# Patient Record
Sex: MALE | Race: WHITE | Employment: FULL TIME | URBAN - METROPOLITAN AREA
[De-identification: names, ages, dates, MRNs, and addresses within clinical notes are randomized per-mention and may not be internally consistent; named-entity substitution may affect disease eponyms.]

---

## 2017-01-17 ENCOUNTER — TELEPHONE (OUTPATIENT)
Dept: NEUROLOGY | Age: 57
End: 2017-01-17

## 2017-01-17 NOTE — TELEPHONE ENCOUNTER
PT's wife would like a nurse to call her.  The pt had an episode that she describes as \"Strange and different\"

## 2017-01-17 NOTE — TELEPHONE ENCOUNTER
R/t call to patient's wife Guillaume Alvarez. She states her  is doing too good. He's having another one of his forgetful episodes this morning he started to shake and jerk really bad it lasted for about 45 minutes. He had his chin on his chest and appeared to be sleep when she called his name he jerked and woke up but didn't recognize her, had no memory of what had just happened or where he was. Had previously went to the store like he always does but came back with a lot of items that he doesn't normally buy and didn't know why he had brought them but remembered going to the store. He normally doesn't remember anything after having a grand mal but she doesn't think he had one of those. Would like to bring him in for an appointment. I acknowledged understanding. Appointment scheduled for 1/18/17 at 1:30 pm with an 1:15 pm arrival time at the [facility] office with the NP. She verbalized understanding.

## 2017-01-18 ENCOUNTER — OFFICE VISIT (OUTPATIENT)
Dept: NEUROLOGY | Age: 57
End: 2017-01-18

## 2017-01-18 VITALS
DIASTOLIC BLOOD PRESSURE: 76 MMHG | SYSTOLIC BLOOD PRESSURE: 118 MMHG | OXYGEN SATURATION: 96 % | RESPIRATION RATE: 16 BRPM | WEIGHT: 254 LBS | BODY MASS INDEX: 32.6 KG/M2 | HEIGHT: 74 IN | HEART RATE: 66 BPM

## 2017-01-18 DIAGNOSIS — G40.209 PARTIAL EPILEPSY WITH IMPAIRMENT OF CONSCIOUSNESS (HCC): Primary | ICD-10-CM

## 2017-01-18 DIAGNOSIS — R40.4 ALTERED LEVEL OF CONSCIOUSNESS: ICD-10-CM

## 2017-01-18 NOTE — PROGRESS NOTES
Patient present for a follow up. Accompanied with wife. Reports experienced a lapse in time yesterday and hasn't experienced that in almost 11 years when he had his first grand mal.   Doesn't remember anything, feels a little off balance.

## 2017-01-18 NOTE — PATIENT INSTRUCTIONS
[facility]   Statement to Patients  April 1, 2014      In an effort to ensure the large volume of patient prescription refills is processed in the most efficient and expeditious manner, we are asking our patients to assist us by calling your Pharmacy for all prescription refills, this will include also your  Mail Order Pharmacy. The pharmacy will contact our office electronically to continue the refill process. Please do not wait until the last minute to call your pharmacy. We need at least 48 hours (2days) to fill prescriptions. We also encourage you to call your pharmacy before going to  your prescription to make sure it is ready. With regard to controlled substance prescription refill requests (narcotic refills) that need to be picked up at our office, we ask your cooperation by providing us with at least 72 hours (3days) notice that you will need a refill. We will not refill narcotic prescription refill requests after 4:00pm on any weekday, Monday through Thursday, or after 2:00pm on Fridays, or on the weekends. We encourage everyone to explore another way of getting your prescription refill request processed using wali, our patient web portal through our electronic medical record system. wali is an efficient and effective way to communicate your medication request directly to the office and  downloadable as an jonathan on your smart phone . wali also features a review functionality that allows you to view your medication list as well as leave messages for your physician. Are you ready to get connected? If so please review the attatched instructions or speak to any of our staff to get you set up right away! Thank you so much for your cooperation. Should you have any questions please contact our Practice Administrator.     The Physicians and Staff,  [facility]         Thank you for choosing [facility] and [facility] for your     care. You may receive a survey about your visit. We appreciate you taking time     to complete this survey as we use your feedback to improve our services. We     realize we are not perfect, but we strive to provide excellent care. Learning About Bereket Aldana  What is a living will? A living will is a legal form you use to write down the kind of care you want at the end of your life. It is used by the health professionals who will treat you if you aren't able to decide for yourself. If you put your wishes in writing, your loved ones and others will know what kind of care you want. They won't need to guess. This can ease your mind and be helpful to others. A living will is not the same as an estate or property will. An estate will explains what you want to happen with your money and property after you die. Is a living will a legal document? A living will is a legal document. Each state has its own laws about living blackman. If you move to another state, make sure that your living will is legal in the state where you now live. Or you might use a universal form that has been approved by many states. This kind of form can sometimes be completed and stored online. Your electronic copy will then be available wherever you have a connection to the Internet. In most cases, doctors will respect your wishes even if you have a form from a different state. · You don't need an  to complete a living will. But legal advice can be helpful if your state's laws are unclear, your health history is complicated, or your family can't agree on what should be in your living will. · You can change your living will at any time. Some people find that their wishes about end-of-life care change as their health changes. · In addition to making a living will, think about completing a medical power of  form.  This form lets you name the person you want to make end-of-life treatment decisions for you (your \"health care agent\") if you're not able to. Many hospitals and nursing homes will give you the forms you need to complete a living will and a medical power of . · Your living will is used only if you can't make or communicate decisions for yourself anymore. If you become able to make decisions again, you can accept or refuse any treatment, no matter what you wrote in your living will. · Your state may offer an online registry. This is a place where you can store your living will online so the doctors and nurses who need to treat you can find it right away. What should you think about when creating a living will? Talk about your end-of-life wishes with your family members and your doctor. Let them know what you want. That way the people making decisions for you won't be surprised by your choices. Think about these questions as you make your living will:  · Do you know enough about life support methods that might be used? If not, talk to your doctor so you know what might be done if you can't breathe on your own, your heart stops, or you're unable to swallow. · What things would you still want to be able to do after you receive life-support methods? Would you want to be able to walk? To speak? To eat on your own? To live without the help of machines? · If you have a choice, where do you want to be cared for? In your home? At a hospital or nursing home? · Do you want certain Pentecostalism practices performed if you become very ill? · If you have a choice at the end of your life, where would you prefer to die? At home? In a hospital or nursing home? Somewhere else? · Would you prefer to be buried or cremated? · Do you want your organs to be donated after you die? What should you do with your living will? · Make sure that your family members and your health care agent have copies of your living will. · Give your doctor a copy of your living will to keep in your medical record.  If you have more than one doctor, make sure that each one has a copy. · You may want to put a copy of your living will where it can be easily found. Where can you learn more? Go to http://trent-karen.info/. Enter W811 in the search box to learn more about \"Learning About Living Perroy. \"  Current as of: February 24, 2016  Content Version: 11.1  © 2730-[address]. Care instructions adapted under license by [employer] (which disclaims liability or warranty for this information). If you have questions about a medical condition or this instruction, always ask your healthcare professional. [address] any warranty or liability for your use of this information.

## 2017-01-18 NOTE — MR AVS SNAPSHOT
Visit Information Date & Time Provider Department Dept. Phone Encounter #  
 1/18/2017  1:30 PM Gagandeep Vasques NP [facility] [phone] [id number] Follow-up Instructions Return after testing. Upcoming Health Maintenance Date Due Hepatitis C Screening 1960 DTaP/Tdap/Td series (1 - Tdap) 12/12/1981 FOBT Q 1 YEAR AGE 50-75 12/12/2010 INFLUENZA AGE 9 TO ADULT 8/1/2016 Allergies as of 1/18/2017  Review Complete On: 1/18/2017 By: Alexey Chacon LPN Severity Noted Reaction Type Reactions Levetiracetam  08/16/2012    Other (comments) Generic form causes severe Headache Current Immunizations  Never Reviewed No immunizations on file. Not reviewed this visit You Were Diagnosed With   
  
 Codes Comments [facility] epilepsy [facility] of [facility]    -  Primary ICD-10-CM: U07.054 ICD-9-CM: 345.40 Vitals BP Pulse Resp Height(growth percentile) Weight(growth percentile) SpO2  
 118/76 66 16 6' 2.21\" (1.885 m) 254 lb (115.2 kg) 96% BMI Smoking Status 32.43 kg/m2 Former Smoker Vitals History BMI and BSA Data Body Mass Index Body Surface Area  
 32.43 kg/m 2 2.46 m 2 Preferred Pharmacy Pharmacy Name Phone [id number], [address]) [phone] Your Updated Medication List  
  
   
This list is accurate as of: 1/18/17  2:17 PM.  Always use your most recent med list.  
  
  
  
  
 BENICAR 20 mg tablet Generic drug:  olmesartan Take 20 mg by mouth daily. levETIRAcetam 500 mg tablet Commonly known as:  KEPPRA Take 3 and 1/2 tablets in the AM and 3 and 1/2 tablets in the PM Brand name Ileana Only * ZOLMitriptan 5 mg nasal solution Commonly known as:  ZOMIG  
1 spray in nostril at HA onset. May repeat again in 2 hours X 1 dose.   No mor then 2 doses in 24 hours * ZOLMitriptan 5 mg nasal solution Commonly known as:  ZOMIG  
1 Spray by Nasal route once as needed for Migraine for up to 1 dose. * Notice: This list has 2 medication(s) that are the same as other medications prescribed for you. Read the directions carefully, and ask your doctor or other care provider to review them with you. We Performed the Following CBC WITH AUTOMATED DIFF [30104 CPT(R)] LEVETIRACETAM (KEPPRA) B606194 CPT(R)] METABOLIC PANEL, COMPREHENSIVE [33883 CPT(R)] VITAMIN B12 [id number] CPT(R)] Follow-up Instructions Return after testing. To-Do List   
 01/18/2017 Imaging:  DUPLEX CAROTID BILATERAL AMB NEURO   
  
 01/18/2017 Neurology:  EEG AMB NEURO   
  
 01/18/2017 Imaging:  MRI BRAIN W WO CONT   
  
 01/18/2017 Neurology:  NEURO EEG 24 HR Referral Information Referral ID Referred By Referred To  
  
 [id number] Catrachita Arango Not Available Visits Status Start Date End Date 1 New Request 1/18/17 1/18/18 If your referral has a status of pending review or denied, additional information will be sent to support the outcome of this decision. Referral ID Referred By Referred To  
 [id number] Catrachita Pnea Mizaira Not Available Visits Status Start Date End Date 1 New Request 1/18/17 1/18/18 If your referral has a status of pending review or denied, additional information will be sent to support the outcome of this decision. Patient Instructions PRESCRIPTION REFILL POLICY [facility] Statement to Patients April 1, 2014 In an effort to ensure the large volume of patient prescription refills is processed in the most efficient and expeditious manner, we are asking our patients to assist us by calling your Pharmacy for all prescription refills, this will include also your  Mail Order Pharmacy.  The pharmacy will contact our office electronically to continue the refill process. Please do not wait until the last minute to call your pharmacy. We need at least 48 hours (2days) to fill prescriptions. We also encourage you to call your pharmacy before going to  your prescription to make sure it is ready. With regard to controlled substance prescription refill requests (narcotic refills) that need to be picked up at our office, we ask your cooperation by providing us with at least 72 hours (3days) notice that you will need a refill. We will not refill narcotic prescription refill requests after 4:00pm on any weekday, Monday through Thursday, or after 2:00pm on Fridays, or on the weekends. We encourage everyone to explore another way of getting your prescription refill request processed using Rypple, our patient web portal through our electronic medical record system. Rypple is an efficient and effective way to communicate your medication request directly to the office and  downloadable as an jonathan on your smart phone . Rypple also features a review functionality that allows you to view your medication list as well as leave messages for your physician. Are you ready to get connected? If so please review the attatched instructions or speak to any of our staff to get you set up right away! Thank you so much for your cooperation. Should you have any questions please contact our Practice Administrator. The Physicians and Staff,  [facility] Thank you for choosing [facility] and [facility] for your  
 
care. You may receive a survey about your visit. We appreciate you taking time  
 
to complete this survey as we use your feedback to improve our services. We  
 
realize we are not perfect, but we strive to provide excellent care. Edvin Rasmussen 1727 What is a living will? A living will is a legal form you use to write down the kind of care you want at the end of your life. It is used by the health professionals who will treat you if you aren't able to decide for yourself. If you put your wishes in writing, your loved ones and others will know what kind of care you want. They won't need to guess. This can ease your mind and be helpful to others. A living will is not the same as an estate or property will. An estate will explains what you want to happen with your money and property after you die. Is a living will a legal document? A living will is a legal document. Each state has its own laws about living blackman. If you move to another state, make sure that your living will is legal in the state where you now live. Or you might use a universal form that has been approved by many states. This kind of form can sometimes be completed and stored online. Your electronic copy will then be available wherever you have a connection to the Internet. In most cases, doctors will respect your wishes even if you have a form from a different state. · You don't need an  to complete a living will. But legal advice can be helpful if your state's laws are unclear, your health history is complicated, or your family can't agree on what should be in your living will. · You can change your living will at any time. Some people find that their wishes about end-of-life care change as their health changes. · In addition to making a living will, think about completing a medical power of  form. This form lets you name the person you want to make end-of-life treatment decisions for you (your \"health care agent\") if you're not able to. Many hospitals and nursing homes will give you the forms you need to complete a living will and a medical power of .  
· Your living will is used only if you can't make or communicate decisions for yourself anymore. If you become able to make decisions again, you can accept or refuse any treatment, no matter what you wrote in your living will. · Your state may offer an online registry. This is a place where you can store your living will online so the doctors and nurses who need to treat you can find it right away. What should you think about when creating a living will? Talk about your end-of-life wishes with your family members and your doctor. Let them know what you want. That way the people making decisions for you won't be surprised by your choices. Think about these questions as you make your living will: · Do you know enough about life support methods that might be used? If not, talk to your doctor so you know what might be done if you can't breathe on your own, your heart stops, or you're unable to swallow. · What things would you still want to be able to do after you receive life-support methods? Would you want to be able to walk? To speak? To eat on your own? To live without the help of machines? · If you have a choice, where do you want to be cared for? In your home? At a hospital or nursing home? · Do you want certain Sabianism practices performed if you become very ill? · If you have a choice at the end of your life, where would you prefer to die? At home? In a hospital or nursing home? Somewhere else? · Would you prefer to be buried or cremated? · Do you want your organs to be donated after you die? What should you do with your living will? · Make sure that your family members and your health care agent have copies of your living will. · Give your doctor a copy of your living will to keep in your medical record. If you have more than one doctor, make sure that each one has a copy. · You may want to put a copy of your living will where it can be easily found. Where can you learn more? Go to http://trent-karen.info/. Enter Z504 in the search box to learn more about \"Learning About Bereket Ellington. \" Current as of: February 24, 2016 Content Version: 11.1 © 7773-1466 [employer]. Care instructions adapted under license by [employer] (which disclaims liability or warranty for this information). If you have questions about a medical condition or this instruction, always ask your healthcare professional. [address] any warranty or liability for your use of this information. Introducing [facility]! [facility] introduces [employer] patient portal. Now you can access parts of your medical record, email your doctor's office, and request medication refills online. 1. In your internet browser, go to [email or web address] 2. Click on the First Time User? Click Here link in the Sign In box. You will see the New Member Sign Up page. 3. Enter your [employer] Access Code exactly as it appears below. You will not need to use this code after youve completed the sign-up process. If you do not sign up before the expiration date, you must request a new code. · [employer] Access Code: [email or web address] Expires: 4/18/2017  1:35 PM 
 
4. Enter the last four digits of your Social Security Number (xxxx) and Date of Birth (mm/dd/yyyy) as indicated and click Submit. You will be taken to the next sign-up page. 5. Create a [employer] ID. This will be your [employer] login ID and cannot be changed, so think of one that is secure and easy to remember. 6. Create a [employer] password. You can change your password at any time. 7. Enter your Password Reset Question and Answer. This can be used at a later time if you forget your password. 8. Enter your e-mail address. You will receive e-mail notification when new information is available in [address] 9. Click Sign Up. You can now view and download portions of your medical record. 10. Click the Download Summary menu link to download a portable copy of your medical information. If you have questions, please visit the Frequently Asked Questions section of the [employer] website. Remember, [employer] is NOT to be used for urgent needs. For medical emergencies, dial 911. Now available from your iPhone and Android! Please provide this summary of care documentation to your next provider. Your primary care clinician is listed as Abilio Roberts. If you have any questions after today's visit, please call [phone].
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
## 2017-01-20 LAB
ALBUMIN SERPL-MCNC: 4.6 G/DL (ref 3.5–5.5)
ALBUMIN/GLOB SERPL: 2.1 {RATIO} (ref 1.1–2.5)
ALP SERPL-CCNC: 53 IU/L (ref 39–117)
ALT SERPL-CCNC: 30 IU/L (ref 0–44)
AST SERPL-CCNC: 23 IU/L (ref 0–40)
BASOPHILS # BLD AUTO: 0 X10E3/UL (ref 0–0.2)
BASOPHILS NFR BLD AUTO: 0 %
BILIRUB SERPL-MCNC: 0.4 MG/DL (ref 0–1.2)
BUN SERPL-MCNC: 13 MG/DL (ref 6–24)
BUN/CREAT SERPL: 22 (ref 9–20)
CALCIUM SERPL-MCNC: 9.1 MG/DL (ref 8.7–10.2)
CHLORIDE SERPL-SCNC: 100 MMOL/L (ref 96–106)
CO2 SERPL-SCNC: 25 MMOL/L (ref 18–29)
CREAT SERPL-MCNC: 0.6 MG/DL (ref 0.76–1.27)
EOSINOPHIL # BLD AUTO: 0.2 X10E3/UL (ref 0–0.4)
EOSINOPHIL NFR BLD AUTO: 2 %
ERYTHROCYTE [DISTWIDTH] IN BLOOD BY AUTOMATED COUNT: 13.7 % (ref 12.3–15.4)
GLOBULIN SER CALC-MCNC: 2.2 G/DL (ref 1.5–4.5)
GLUCOSE SERPL-MCNC: 70 MG/DL (ref 65–99)
HCT VFR BLD AUTO: 40.9 % (ref 37.5–51)
HGB BLD-MCNC: 13.6 G/DL (ref 12.6–17.7)
IMM GRANULOCYTES # BLD: 0 X10E3/UL (ref 0–0.1)
IMM GRANULOCYTES NFR BLD: 0 %
LEVETIRACETAM SERPL-MCNC: 17.4 UG/ML (ref 10–40)
LYMPHOCYTES # BLD AUTO: 2.7 X10E3/UL (ref 0.7–3.1)
LYMPHOCYTES NFR BLD AUTO: 31 %
MCH RBC QN AUTO: 29.6 PG (ref 26.6–33)
MCHC RBC AUTO-ENTMCNC: 33.3 G/DL (ref 31.5–35.7)
MCV RBC AUTO: 89 FL (ref 79–97)
MONOCYTES # BLD AUTO: 0.7 X10E3/UL (ref 0.1–0.9)
MONOCYTES NFR BLD AUTO: 8 %
NEUTROPHILS # BLD AUTO: 5 X10E3/UL (ref 1.4–7)
NEUTROPHILS NFR BLD AUTO: 59 %
PLATELET # BLD AUTO: 266 X10E3/UL (ref 150–379)
POTASSIUM SERPL-SCNC: 4.1 MMOL/L (ref 3.5–5.2)
PROT SERPL-MCNC: 6.8 G/DL (ref 6–8.5)
RBC # BLD AUTO: 4.6 X10E6/UL (ref 4.14–5.8)
SODIUM SERPL-SCNC: 139 MMOL/L (ref 134–144)
VIT B12 SERPL-MCNC: 391 PG/ML (ref 211–946)
WBC # BLD AUTO: 8.5 X10E3/UL (ref 3.4–10.8)

## 2017-01-24 ENCOUNTER — HOSPITAL ENCOUNTER (OUTPATIENT)
Dept: MRI IMAGING | Age: 57
Discharge: HOME | End: 2017-01-24
Attending: NURSE PRACTITIONER

## 2017-01-24 DIAGNOSIS — G40.209 PARTIAL EPILEPSY WITH IMPAIRMENT OF CONSCIOUSNESS (HCC): Primary | ICD-10-CM

## 2017-01-24 DIAGNOSIS — G40.209 PARTIAL EPILEPSY WITH IMPAIRMENT OF CONSCIOUSNESS (HCC): ICD-10-CM

## 2017-01-24 DIAGNOSIS — R41.3 MEMORY LOSS: ICD-10-CM

## 2017-01-27 ENCOUNTER — OFFICE VISIT (OUTPATIENT)
Dept: NEUROLOGY | Age: 57
End: 2017-01-27

## 2017-01-27 DIAGNOSIS — R42 DIZZINESS AND GIDDINESS: Primary | ICD-10-CM

## 2017-01-27 DIAGNOSIS — G40.209 PARTIAL EPILEPSY WITH IMPAIRMENT OF CONSCIOUSNESS (HCC): ICD-10-CM

## 2017-01-30 NOTE — PROCEDURES
EEG:      Date:  01/27/17    Requesting Physician:  Sara Styles NP     An EEG is requested in this 64 y.o. man with history of epilepsy to evaluate for epileptiform abnormalities. Medications are listed as Zomig, Keppra, Benicar. This tracing is obtained during the awake state. During wakefulness, there are intermittent runs of posteriorly dominant and symmetrical low to medium amplitude 10-11 cycle per second activities which attenuate with eye opening. Lower voltage faster frequency activities are seen symmetrically over the anterior head regions. Hyperventilation is performed for three minutes with good effort and little alters tracing. Intermittent photic stimulation induces symmetric posterior driving responses. Sleep is not obtained. Interpretation: This EEG recorded during the awake state is normal.  No epileptiform abnormalities are seen.

## 2017-01-31 NOTE — PROCEDURES
Carotid Doppler:     Date:  01/27/17    Requesting Physician:  Mercedez Padron NP     Indication:  Dizziness     B-mode imaging reveals no significant plaque throughout the carotid systems bilaterally. Doppler spectral analysis reveals no elevated velocities. Vertebral artery flow antegrade bilaterally. Interpretation:  Normal study.

## 2017-02-02 ENCOUNTER — HOSPITAL ENCOUNTER (OUTPATIENT)
Dept: NEUROLOGY | Age: 57
Discharge: HOME | End: 2017-02-02
Attending: NURSE PRACTITIONER
Payer: COMMERCIAL

## 2017-02-02 ENCOUNTER — HOSPITAL ENCOUNTER (OUTPATIENT)
Dept: CT IMAGING | Age: 57
Discharge: HOME | End: 2017-02-02
Attending: NURSE PRACTITIONER
Payer: COMMERCIAL

## 2017-02-02 DIAGNOSIS — R41.3 MEMORY LOSS: ICD-10-CM

## 2017-02-02 DIAGNOSIS — G40.209 PARTIAL EPILEPSY WITH IMPAIRMENT OF CONSCIOUSNESS (HCC): ICD-10-CM

## 2017-02-02 PROCEDURE — 95953 NEURO EEG 24 HR: CPT

## 2017-02-02 PROCEDURE — 74011636320 HC RX REV CODE- 636/320: Performed by: RADIOLOGY

## 2017-02-02 PROCEDURE — 70470 CT HEAD/BRAIN W/O & W/DYE: CPT

## 2017-02-02 RX ADMIN — IOPAMIDOL 100 ML: 612 INJECTION, SOLUTION INTRAVENOUS at 08:02

## 2017-02-12 NOTE — PROCEDURES
[facility]   [address] [facility], [address]   ROUTINE EEG REPORT       Name:  Christina Sinha   MR#:  [id number]   :  1960   Account #:  [de-identified]    Date of Procedure:  2017   Date of Adm:  2017       REQUESTING PHYSICIAN: Dajuan Mack NP.    REASON FOR STUDY: A 24-hour ambulatory EEG is requested in   this 59-year-old man with history of epilepsy and new recurrent events   to evaluate for epileptiform abnormalities. MEDICATIONS LISTED AS   1. Keppra. 2. Zomig. 3. Benicar. RECORDING START TIME: 0853 hours, 2017. RECORDING END TIME: 0750 hours 2017. TOTAL RECORDING TIME: 22 hours 57 minutes. A patient diary accompanies the tracing. Computerized spike and   seizure detection software is utilized. During wakefulness, the background consists of diffuse low-voltage   fast frequency beta wave activity. Normal sleep architecture is seen. Review of patient diary finds no abnormal events of interest.    Review of computerized spike and seizure detection software reveals   no spikes, no seizures.     INTERPRETATION: A 24-hour ambulatory electroencephalogram is   normal.        Jennifer Fry MD      SLE / RS   D:  2017   18:11   T:  2017   05:30   Job #:  [id number]

## 2017-02-17 ENCOUNTER — OFFICE VISIT (OUTPATIENT)
Dept: NEUROLOGY | Age: 57
End: 2017-02-17

## 2017-02-17 VITALS
BODY MASS INDEX: 32.73 KG/M2 | SYSTOLIC BLOOD PRESSURE: 118 MMHG | WEIGHT: 255 LBS | HEART RATE: 75 BPM | DIASTOLIC BLOOD PRESSURE: 72 MMHG | OXYGEN SATURATION: 97 % | RESPIRATION RATE: 18 BRPM | HEIGHT: 74 IN

## 2017-02-17 DIAGNOSIS — G40.209 PARTIAL EPILEPSY WITH IMPAIRMENT OF CONSCIOUSNESS (HCC): Primary | ICD-10-CM

## 2017-02-17 RX ORDER — ASPIRIN 81 MG/1
TABLET ORAL DAILY
COMMUNITY
End: 2017-10-30

## 2017-02-17 NOTE — MR AVS SNAPSHOT
Visit Information Date & Time Provider Department Dept. Phone Encounter #  
 2/17/2017 11:30 AM Rudi Dose, NP Neurology [facility] [phone] [id number] Follow-up Instructions Return in about 2 months (around 4/17/2017). Upcoming Health Maintenance Date Due Hepatitis C Screening 1960 DTaP/Tdap/Td series (1 - Tdap) 12/12/1981 FOBT Q 1 YEAR AGE 50-75 12/12/2010 INFLUENZA AGE 9 TO ADULT 8/1/2016 Allergies as of 2/17/2017  Review Complete On: 2/17/2017 By: Zenaida Cunningham LPN Severity Noted Reaction Type Reactions Levetiracetam  08/16/2012    Other (comments) Generic form causes severe Headache Current Immunizations  Never Reviewed No immunizations on file. Not reviewed this visit You Were Diagnosed With   
  
 Codes Comments Partial [facility] ([facility]    -  Primary ICD-10-CM: M05.889 ICD-9-CM: 345.40 Vitals BP Pulse Resp Height(growth percentile) Weight(growth percentile) SpO2  
 118/72 75 18 6' 2.21\" (1.885 m) 255 lb (115.7 kg) 97% BMI Smoking Status 32.56 kg/m2 Former Smoker Vitals History BMI and BSA Data Body Mass Index Body Surface Area 32.56 kg/m 2 2.46 m 2 Preferred Pharmacy Pharmacy Name Phone [facility]/PHARMACY #[id number]- [place], [address] [facility] [address] [place] [address]) [phone] Your Updated Medication List  
  
   
This list is accurate as of: 2/17/17 12:18 PM.  Always use your most recent med list.  
  
  
  
  
 aspirin delayed-release 81 mg tablet Take  by mouth daily. BENICAR 20 mg tablet Generic drug:  olmesartan Take 20 mg by mouth daily. eslicarbazepine 510 mg Tab Commonly known as:  APTIOM Take 1 Tab by mouth daily. levETIRAcetam 500 mg tablet Commonly known as:  KEPPRA Take 3 and 1/2 tablets in the AM and 3 and 1/2 tablets in the PM Brand name Torrent Only * ZOLMitriptan 5 mg nasal solution Commonly known as:  ZOMIG  
1 spray in nostril at HA onset. May repeat again in 2 hours X 1 dose. No mor then 2 doses in 24 hours * ZOLMitriptan 5 mg nasal solution Commonly known as:  ZOMIG  
1 Spray by Nasal route once as needed for Migraine for up to 1 dose. * Notice: This list has 2 medication(s) that are the same as other medications prescribed for you. Read the directions carefully, and ask your doctor or other care provider to review them with you. Prescriptions Sent to Pharmacy Refills  
 eslicarbazepine (APTIOM) 800 mg tab 5 Sig: Take 1 Tab by mouth daily. Class: Normal  
 Pharmacy: [facility]/pharmacy #[id number]- [address], [address] ([place] [address]) Ph #: [phone] Route: Oral  
  
Follow-up Instructions Return in about 2 months (around 4/17/2017). Patient Instructions PRESCRIPTION REFILL POLICY [facility] Statement to Patients April 1, 2014 In an effort to ensure the large volume of patient prescription refills is processed in the most efficient and expeditious manner, we are asking our patients to assist us by calling your Pharmacy for all prescription refills, this will include also your  Mail Order Pharmacy. The pharmacy will contact our office electronically to continue the refill process. Please do not wait until the last minute to call your pharmacy. We need at least 48 hours (2days) to fill prescriptions. We also encourage you to call your pharmacy before going to  your prescription to make sure it is ready. With regard to controlled substance prescription refill requests (narcotic refills) that need to be picked up at our office, we ask your cooperation by providing us with at least 72 hours (3days) notice that you will need a refill. We will not refill narcotic prescription refill requests after 4:00pm on any weekday, Monday through Thursday, or after 2:00pm on Fridays, or on the weekends. We encourage everyone to explore another way of getting your prescription refill request processed using [employer], our patient web portal through our electronic medical record system. [employer] is an efficient and effective way to communicate your medication request directly to the office and  downloadable as an jonathan on your smart phone . [employer] also features a review functionality that allows you to view your medication list as well as leave messages for your physician. Are you ready to get connected? If so please review the attatched instructions or speak to any of our staff to get you set up right away! Thank you so much for your cooperation. Should you have any questions please contact our Practice Administrator. The Physicians and Staff,  [facility] [phone] What is a living will? A living will is a legal form you use to write down the kind of care you want at the end of your life. It is used by the health professionals who will treat you if you aren't able to decide for yourself. If you put your wishes in writing, your loved ones and others will know what kind of care you want. They won't need to guess. This can ease your mind and be helpful to others. A living will is not the same as an estate or property will. An estate will explains what you want to happen with your money and property after you die. Is a living will a legal document? A living will is a legal document. Each state has its own laws about living blackman. If you move to another state, make sure that your living will is legal in the state where you now live. Or you might use a universal form that has been approved by many states. This kind of form can sometimes be completed and stored online.  Your electronic copy will then be available wherever you have a connection to the Internet. In most cases, doctors will respect your wishes even if you have a form from a different state. · You don't need an  to complete a living will. But legal advice can be helpful if your state's laws are unclear, your health history is complicated, or your family can't agree on what should be in your living will. · You can change your living will at any time. Some people find that their wishes about end-of-life care change as their health changes. · In addition to making a living will, think about completing a medical power of  form. This form lets you name the person you want to make end-of-life treatment decisions for you (your \"health care agent\") if you're not able to. Many hospitals and nursing homes will give you the forms you need to complete a living will and a medical power of . · Your living will is used only if you can't make or communicate decisions for yourself anymore. If you become able to make decisions again, you can accept or refuse any treatment, no matter what you wrote in your living will. · Your state may offer an online registry. This is a place where you can store your living will online so the doctors and nurses who need to treat you can find it right away. What should you think about when creating a living will? Talk about your end-of-life wishes with your family members and your doctor. Let them know what you want. That way the people making decisions for you won't be surprised by your choices. Think about these questions as you make your living will: · Do you know enough about life support methods that might be used? If not, talk to your doctor so you know what might be done if you can't breathe on your own, your heart stops, or you're unable to swallow.  
· What things would you still want to be able to do after you receive life-support methods? Would you want to be able to walk? To speak? To eat on your own? To live without the help of machines? · If you have a choice, where do you want to be cared for? In your home? At a hospital or nursing home? · Do you want certain Mandaeism practices performed if you become very ill? · If you have a choice at the end of your life, where would you prefer to die? At home? In a hospital or nursing home? Somewhere else? · Would you prefer to be buried or cremated? · Do you want your organs to be donated after you die? What should you do with your living will? · Make sure that your family members and your health care agent have copies of your living will. · Give your doctor a copy of your living will to keep in your medical record. If you have more than one doctor, make sure that each one has a copy. · You may want to put a copy of your living will where it can be easily found. Where can you learn more? Go to http://trent-karen.info/. Enter S474 in the search box to learn more about \"Learning About Living Perroy. \" Current as of: February 24, 2016 Content Version: 11.1 © 4183-5380 [employer], [employer]. Care instructions adapted under license by [employer] (which disclaims liability or warranty for this information). If you have questions about a medical condition or this instruction, always ask your healthcare professional. [address] any warranty or liability for your use of this information. Aptiom 400 mg daily X 2 weeks Then increase to Aptiom 600 mg daily X 2 weeks Then increase to Aptiom 800 mg daily and stick here. Continue Keppra at 1750 twice daily X 2 weeks Then decrease to 1500 mg twice daily X 1 week Then decrease to 1250 mg twice daily X 1 week Then decrease to 1000 mg daily X 1 week Then decrease to 500 mg daily X 1 week Then stop. Continue Aptiom at 800 mg daily. Introducing [facility]! [facility] introduces [employer] patient portal. Now you can access parts of your medical record, email your doctor's office, and request medication refills online. 1. In your internet browser, go to [email or web address] 2. Click on the First Time User? Click Here link in the Sign In box. You will see the New Member Sign Up page. 3. Enter your [employer] Access Code exactly as it appears below. You will not need to use this code after youve completed the sign-up process. If you do not sign up before the expiration date, you must request a new code. · [employer] Access Code: [email or web address] Expires: 4/18/2017  1:35 PM 
 
4. Enter the last four digits of your Social Security Number (xxxx) and Date of Birth (mm/dd/yyyy) as indicated and click Submit. You will be taken to the next sign-up page. 5. Create a [employer] ID. This will be your [employer] login ID and cannot be changed, so think of one that is secure and easy to remember. 6. Create a [employer] password. You can change your password at any time. 7. Enter your Password Reset Question and Answer. This can be used at a later time if you forget your password. 8. Enter your e-mail address. You will receive e-mail notification when new information is available in [address] 9. Click Sign Up. You can now view and download portions of your medical record. 10. Click the Download Summary menu link to download a portable copy of your medical information. If you have questions, please visit the Frequently Asked Questions section of the [employer] website. Remember, [employer] is NOT to be used for urgent needs. For medical emergencies, dial 911. Now available from your iPhone and Android! Please provide this summary of care documentation to your next provider. Your primary care clinician is listed as [facility]. If you have any questions after today's visit, please call [phone].

## 2017-02-17 NOTE — PROGRESS NOTES
Patient present for a follow up. Accompanied with wife. Test results: CT, EEG, 24 hour EEG, Doppler. Reports has been experiencing diarrhea 2 to 3 times a week due to taking the generic Keppra. Insurance will not pay for the Brand name.

## 2017-02-17 NOTE — PROGRESS NOTES
Mary Austin is a 64 y.o. male who presents with the following  Chief Complaint   Patient presents with    Results     CT, EEG, 24 hour EEG, Doppler       HPI Patient comes in for a follow up for CT, EEG, 24 hour EEG, doppler. Accompanied with wife. He has not  Had any seizure activity since his last episode. Has been having diarrhea and sleepiness since ever starting the keppra and when we increased a few months ago he states it has been worse since. Was on Depakote before this and had some side effects to this he could not tolerate. The Keppra side effects are too much now as he has diarrhea multiple times a day and week. Allergies   Allergen Reactions    Levetiracetam Other (comments)     Generic form causes severe Headache       Current Outpatient Prescriptions   Medication Sig    aspirin delayed-release 81 mg tablet Take  by mouth daily.  eslicarbazepine (APTIOM) 800 mg tab Take 1 Tab by mouth daily.  levETIRAcetam (KEPPRA) 500 mg tablet Take 3 and 1/2 tablets in the AM and 3 and 1/2 tablets in the PM Brand name Torrent Only    ZOLMitriptan (ZOMIG) 5 mg nasal solution 1 spray in nostril at HA onset. May repeat again in 2 hours X 1 dose. No mor then 2 doses in 24 hours    olmesartan (BENICAR) 20 mg tablet Take 20 mg by mouth daily.  ZOLMitriptan (ZOMIG) 5 mg nasal solution 1 Spray by Nasal route once as needed for Migraine for up to 1 dose. No current facility-administered medications for this visit.         History   Smoking Status    Former Smoker   Smokeless Tobacco    Not on file       Past Medical History   Diagnosis Date    Hypertension     Migraines     Seizures ([address]     Stroke [facility])        Past Surgical History   Procedure Laterality Date    Hx acl reconstruction       right knee 6 times       Family History   Problem Relation Age of Onset    Cancer Father      prostate    Stroke Maternal Aunt     Diabetes Sister        Social History     Social History    Marital status:      Spouse name: N/A    Number of children: N/A    Years of education: N/A     Social History Main Topics    Smoking status: Former Smoker    Smokeless tobacco: None    Alcohol use Yes      Comment: rarely    Drug use: No    Sexual activity: Not Asked     Other Topics Concern    None     Social History Narrative       Review of Systems   HENT: Negative for hearing loss and tinnitus. Eyes: Negative for blurred vision, double vision and photophobia. Cardiovascular: Negative for chest pain and palpitations. Gastrointestinal: Negative for nausea and vomiting. Neurological: Negative for dizziness, tingling, tremors, seizures, loss of consciousness, weakness and headaches. Remainder of comprehensive review is negative. Physical Exam :    Visit Vitals    /72    Pulse 75    Resp 18    Ht 6' 2.21\" (1.885 m)    Wt 115.7 kg (255 lb)    SpO2 97%    BMI 32.56 kg/m2             Results for orders placed or performed in visit on 01/18/17   CBC WITH AUTOMATED DIFF   Result Value Ref Range    WBC 8.5 3.4 - 10.8 x10E3/uL    RBC 4.60 4.14 - 5.80 x10E6/uL    HGB 13.6 12.6 - 17.7 g/dL    HCT 40.9 37.5 - 51.0 %    MCV 89 79 - 97 fL    MCH 29.6 26.6 - 33.0 pg    MCHC 33.3 31.5 - 35.7 g/dL    RDW 13.7 12.3 - 15.4 %    PLATELET 617 975 - 514 x10E3/uL    NEUTROPHILS 59 %    Lymphocytes 31 %    MONOCYTES 8 %    EOSINOPHILS 2 %    BASOPHILS 0 %    ABS. NEUTROPHILS 5.0 1.4 - 7.0 x10E3/uL    Abs Lymphocytes 2.7 0.7 - 3.1 x10E3/uL    ABS. MONOCYTES 0.7 0.1 - 0.9 x10E3/uL    ABS. EOSINOPHILS 0.2 0.0 - 0.4 x10E3/uL    ABS. BASOPHILS 0.0 0.0 - 0.2 x10E3/uL    IMMATURE GRANULOCYTES 0 %    ABS. IMM.  GRANS. 0.0 0.0 - 0.1 D86H0/ZW   METABOLIC PANEL, COMPREHENSIVE   Result Value Ref Range    Glucose 70 65 - 99 mg/dL    BUN 13 6 - 24 mg/dL    Creatinine 0.60 (L) 0.76 - 1.27 mg/dL    GFR est non- >59 mL/min/1.73    GFR est  >59 mL/min/1.73    BUN/Creatinine ratio 22 (H) 9 - 20    Sodium 139 134 - 144 mmol/L    Potassium 4.1 3.5 - 5.2 mmol/L    Chloride 100 96 - 106 mmol/L    CO2 25 18 - 29 mmol/L    Calcium 9.1 8.7 - 10.2 mg/dL    Protein, total 6.8 6.0 - 8.5 g/dL    Albumin 4.6 3.5 - 5.5 g/dL    GLOBULIN, TOTAL 2.2 1.5 - 4.5 g/dL    A-G Ratio 2.1 1.1 - 2.5    Bilirubin, total 0.4 0.0 - 1.2 mg/dL    Alk. phosphatase 53 39 - 117 IU/L    AST (SGOT) 23 0 - 40 IU/L    ALT (SGPT) 30 0 - 44 IU/L   LEVETIRACETAM (KEPPRA)   Result Value Ref Range    LEVETIRACETAM, S 17.4 10.0 - 40.0 ug/mL   VITAMIN B12   Result Value Ref Range    Vitamin B12 391 211 - 946 pg/mL       Orders Placed This Encounter    aspirin delayed-release 81 mg tablet     Sig: Take  by mouth daily.  eslicarbazepine (APTIOM) 800 mg tab     Sig: Take 1 Tab by mouth daily. Dispense:  30 Tab     Refill:  5       1. Partial epilepsy with [facility]        Follow-up Disposition:  Return in about 2 months (around 4/17/2017). CT, EEG, 24 hour EEG, Doppler all normal. No seizure activity. We discussed medications and such treatments and we discussed switching and he understands he is at more risk for seizures. We will switch to Aptiom and increase to 800 mg daily while decreasing down on the Keppra. Discussed with Dr. Rand Casillas titration and attached to instructions on print out. They have no questions at this time.      Maggie Wellington NP        This note will not be viewable in [facility]      Total time: 25 min   Counseling / coordination time: 25min   > 50% counseling / coordination?: Yes re: medications, seizures, treatment, titration

## 2017-02-17 NOTE — TELEPHONE ENCOUNTER
Order placed for Aptiom 400 mg samples #14 per verbal order Merrill Fulton, NP. Order placed for Aptiom 600 mg samples #14 per verbal order Merrill Fulton, NP.      Order placed for Aptiom 800 mg samples #14 per verbal order Merrill Fulton, NP.

## 2017-02-17 NOTE — PATIENT INSTRUCTIONS
[facility]   Statement to Patients  April 1, 2014      In an effort to ensure the large volume of patient prescription refills is processed in the most efficient and expeditious manner, we are asking our patients to assist us by calling your Pharmacy for all prescription refills, this will include also your  Mail Order Pharmacy. The pharmacy will contact our office electronically to continue the refill process. Please do not wait until the last minute to call your pharmacy. We need at least 48 hours (2days) to fill prescriptions. We also encourage you to call your pharmacy before going to  your prescription to make sure it is ready. With regard to controlled substance prescription refill requests (narcotic refills) that need to be picked up at our office, we ask your cooperation by providing us with at least 72 hours (3days) notice that you will need a refill. We will not refill narcotic prescription refill requests after 4:00pm on any weekday, Monday through Thursday, or after 2:00pm on Fridays, or on the weekends. We encourage everyone to explore another way of getting your prescription refill request processed using [employer], our patient web portal through our electronic medical record system. [employer] is an efficient and effective way to communicate your medication request directly to the office and  downloadable as an jonathan on your smart phone . [employer] also features a review functionality that allows you to view your medication list as well as leave messages for your physician. Are you ready to get connected? If so please review the attatched instructions or speak to any of our staff to get you set up right away! Thank you so much for your cooperation. Should you have any questions please contact our Practice Administrator.     The Physicians and Staff,  [facility]  What is a living will?  A living will is a legal form you use to write down the kind of care you want at the end of your life. It is used by the health professionals who will treat you if you aren't able to decide for yourself. If you put your wishes in writing, your loved ones and others will know what kind of care you want. They won't need to guess. This can ease your mind and be helpful to others. A living will is not the same as an estate or property will. An estate will explains what you want to happen with your money and property after you die. Is a living will a legal document? A living will is a legal document. Each state has its own laws about living blackman. If you move to another state, make sure that your living will is legal in the state where you now live. Or you might use a universal form that has been approved by many states. This kind of form can sometimes be completed and stored online. Your electronic copy will then be available wherever you have a connection to the Internet. In most cases, doctors will respect your wishes even if you have a form from a different state. · You don't need an  to complete a living will. But legal advice can be helpful if your state's laws are unclear, your health history is complicated, or your family can't agree on what should be in your living will. · You can change your living will at any time. Some people find that their wishes about end-of-life care change as their health changes. · In addition to making a living will, think about completing a medical power of  form. This form lets you name the person you want to make end-of-life treatment decisions for you (your \"health care agent\") if you're not able to. Many hospitals and nursing homes will give you the forms you need to complete a living will and a medical power of . · Your living will is used only if you can't make or communicate decisions for yourself anymore.  If you become able to make decisions again, you can accept or refuse any treatment, no matter what you wrote in your living will. · Your state may offer an online registry. This is a place where you can store your living will online so the doctors and nurses who need to treat you can find it right away. What should you think about when creating a living will? Talk about your end-of-life wishes with your family members and your doctor. Let them know what you want. That way the people making decisions for you won't be surprised by your choices. Think about these questions as you make your living will:  · Do you know enough about life support methods that might be used? If not, talk to your doctor so you know what might be done if you can't breathe on your own, your heart stops, or you're unable to swallow. · What things would you still want to be able to do after you receive life-support methods? Would you want to be able to walk? To speak? To eat on your own? To live without the help of machines? · If you have a choice, where do you want to be cared for? In your home? At a hospital or nursing home? · Do you want certain Rastafarian practices performed if you become very ill? · If you have a choice at the end of your life, where would you prefer to die? At home? In a hospital or nursing home? Somewhere else? · Would you prefer to be buried or cremated? · Do you want your organs to be donated after you die? What should you do with your living will? · Make sure that your family members and your health care agent have copies of your living will. · Give your doctor a copy of your living will to keep in your medical record. If you have more than one doctor, make sure that each one has a copy. · You may want to put a copy of your living will where it can be easily found. Where can you learn more? Go to http://trent-karen.info/. Enter N098 in the search box to learn more about \"Learning About Living Perdelmi. \"  Current as of: February 24, 2016  Content Version: 11.1  © 6262-8181 [employer]. Care instructions adapted under license by [employer] (which disclaims liability or warranty for this information). If you have questions about a medical condition or this instruction, always ask your healthcare professional. [address] any warranty or liability for your use of this information. Aptiom 400 mg daily X 2 weeks   Then increase to Aptiom 600 mg daily X 2 weeks  Then increase to Aptiom 800 mg daily and stick here. Continue Keppra at 1750 twice daily X 2 weeks   Then decrease to 1500 mg twice daily X 1 week   Then decrease to 1250 mg twice daily X 1 week   Then decrease to 1000 mg daily X 1 week   Then decrease to 500 mg daily X 1 week   Then stop. Continue Aptiom at 800 mg daily.

## 2017-04-17 ENCOUNTER — OFFICE VISIT (OUTPATIENT)
Dept: NEUROLOGY | Age: 57
End: 2017-04-17

## 2017-04-17 VITALS
SYSTOLIC BLOOD PRESSURE: 132 MMHG | BODY MASS INDEX: 32.73 KG/M2 | DIASTOLIC BLOOD PRESSURE: 64 MMHG | WEIGHT: 255 LBS | HEIGHT: 74 IN | RESPIRATION RATE: 20 BRPM

## 2017-04-17 DIAGNOSIS — G40.209 PARTIAL EPILEPSY WITH IMPAIRMENT OF CONSCIOUSNESS (HCC): Primary | ICD-10-CM

## 2017-04-17 NOTE — MR AVS SNAPSHOT
Visit Information Date & Time Provider Department Dept. Phone Encounter #  
 4/17/2017  9:00 AM Nicole Leigh NP Neurology [facility] [phone] [id number] Follow-up Instructions Return in about 4 months (around 8/17/2017). Upcoming Health Maintenance Date Due Hepatitis C Screening 1960 DTaP/Tdap/Td series (1 - Tdap) 12/12/1981 FOBT Q 1 YEAR AGE 50-75 12/12/2010 INFLUENZA AGE 9 TO ADULT 8/1/2016 Allergies as of 4/17/2017  Review Complete On: 2/17/2017 By: Nicole Leigh NP Severity Noted Reaction Type Reactions Levetiracetam  08/16/2012    Other (comments) Generic form causes severe Headache Current Immunizations  Never Reviewed No immunizations on file. Not reviewed this visit Vitals BP Resp Height(growth percentile) Weight(growth percentile) BMI Smoking Status 132/64 20 6' 2.21\" (1.885 m) 255 lb (115.7 kg) 32.56 kg/m2 Former Smoker BMI and BSA Data Body Mass Index Body Surface Area 32.56 kg/m 2 2.46 m 2 [facility]erred Pharm[facility] Name Phone [id number] [address] [place] [address] [phone] Your Updated Medication List  
  
   
This list is accurate as of: 4/17/17  9:22 AM.  Always use your most recent med list.  
  
  
  
  
 aspirin delayed-release 81 mg tablet Take  by mouth daily. BENICAR 20 mg tablet Generic drug:  olmesartan Take 20 mg by mouth daily. eslicarbazepine 154 mg Tab Commonly known as:  APTIOM Take 1 Tab by mouth daily. ZOLMitriptan 5 mg nasal solution Commonly known as:  ZOMIG  
1 spray in nostril at HA onset. May repeat again in 2 hours X 1 dose. No mor then 2 doses in 24 hours Follow-up Instructions Return in about 4 months (around 8/17/2017). Patient Instructions PRESCRIPTION REFILL POLICY [facility] Statement to Patients April 1, 2014 In an effort to ensure the large volume of patient prescription refills is processed in the most efficient and expeditious manner, we are asking our patients to assist us by calling your Pharmacy for all prescription refills, this will include also your  Mail Order Pharmacy. The pharmacy will contact our office electronically to continue the refill process. Please do not wait until the last minute to call your pharmacy. We need at least 48 hours (2days) to fill prescriptions. We also encourage you to call your pharmacy before going to  your prescription to make sure it is ready. With regard to controlled substance prescription refill requests (narcotic refills) that need to be picked up at our office, we ask your cooperation by providing us with at least 72 hours (3days) notice that you will need a refill. We will not refill narcotic prescription refill requests after 4:00pm on any weekday, Monday through Thursday, or after 2:00pm on Fridays, or on the weekends. We encourage everyone to explore another way of getting your prescription refill request processed using Wize, our patient web portal through our electronic medical record system. Wize is an efficient and effective way to communicate your medication request directly to the office and  downloadable as an jonathan on your smart phone . Wize also features a review functionality that allows you to view your medication list as well as leave messages for your physician. Are you ready to get connected? If so please review the attatched instructions or speak to any of our staff to get you set up right away! Thank you so much for your cooperation. Should you have any questions please contact our Practice Administrator. The Physicians and Staff,  [facility] Edvin Rasmussen [address] What is a living will? A living will is a legal form you use to write down the kind of care you want at the end of your life. It is used by the health professionals who will treat you if you aren't able to decide for yourself. If you put your wishes in writing, your loved ones and others will know what kind of care you want. They won't need to guess. This can ease your mind and be helpful to others. A living will is not the same as an estate or property will. An estate will explains what you want to happen with your money and property after you die. Is a living will a legal document? A living will is a legal document. Each state has its own laws about living blackman. If you move to another state, make sure that your living will is legal in the state where you now live. Or you might use a universal form that has been approved by many states. This kind of form can sometimes be completed and stored online. Your electronic copy will then be available wherever you have a connection to the Internet. In most cases, doctors will respect your wishes even if you have a form from a different state. · You don't need an  to complete a living will. But legal advice can be helpful if your state's laws are unclear, your health history is complicated, or your family can't agree on what should be in your living will. · You can change your living will at any time. Some people find that their wishes about end-of-life care change as their health changes. · In addition to making a living will, think about completing a medical power of  form. This form lets you name the person you want to make end-of-life treatment decisions for you (your \"health care agent\") if you're not able to. Many hospitals and nursing homes will give you the forms you need to complete a living will and a medical power of .  
· Your living will is used only if you can't make or communicate decisions for yourself anymore. If you become able to make decisions again, you can accept or refuse any treatment, no matter what you wrote in your living will. · Your state may offer an online registry. This is a place where you can store your living will online so the doctors and nurses who need to treat you can find it right away. What should you think about when creating a living will? Talk about your end-of-life wishes with your family members and your doctor. Let them know what you want. That way the people making decisions for you won't be surprised by your choices. Think about these questions as you make your living will: · Do you know enough about life support methods that might be used? If not, talk to your doctor so you know what might be done if you can't breathe on your own, your heart stops, or you're unable to swallow. · What things would you still want to be able to do after you receive life-support methods? Would you want to be able to walk? To speak? To eat on your own? To live without the help of machines? · If you have a choice, where do you want to be cared for? In your home? At a hospital or nursing home? · Do you want certain Baptist practices performed if you become very ill? · If you have a choice at the end of your life, where would you prefer to die? At home? In a hospital or nursing home? Somewhere else? · Would you prefer to be buried or cremated? · Do you want your organs to be donated after you die? What should you do with your living will? · Make sure that your family members and your health care agent have copies of your living will. · Give your doctor a copy of your living will to keep in your medical record. If you have more than one doctor, make sure that each one has a copy. · You may want to put a copy of your living will where it can be easily found. Where can you learn more? Go to http://trent-karen.info/. Enter I699 in the search box to learn more about \"Learning About Living Ryne. \" Current as of: February 24, 2016 Content Version: 11.2 © 7558-6517 [employer]. Care instructions adapted under license by [employer] (which disclaims liability or warranty for this information). If you have questions about a medical condition or this instruction, always ask your healthcare professional. [address] any warranty or liability for your use of this information. Advance Directives: Care Instructions Your Care Instructions An advance directive is a legal way to state your wishes at the end of your life. It tells your family and your doctor what to do if you can no longer say what you want. There are two main types of advance directives. You can change them any time that your wishes change. · A living will tells your family and your doctor your wishes about life support and other treatment. · A durable power of  for health care lets you name a person to make treatment decisions for you when you can't speak for yourself. This person is called a health care agent. If you do not have an advance directive, decisions about your medical care may be made by a doctor or a  who doesn't know you. It may help to think of an advance directive as a gift to the people who care for you. If you have one, they won't have to make tough decisions by themselves. Follow-up care is a key part of your treatment and safety. Be sure to make and go to all appointments, and call your doctor if you are having problems. It's also a good idea to know your test results and keep a list of the medicines you take. How can you care for yourself at home? · Discuss your wishes with your loved ones and your doctor. This way, there are no surprises. · Many states have a unique form.  Or you might use a universal form that has been approved by many states. This kind of form can sometimes be completed and stored online. Your electronic copy will then be available wherever you have a connection to the Internet. In most cases, doctors will respect your wishes even if you have a form from a different state. · You don't need a  to do an advance directive. But you may want to get legal advice. · Think about these questions when you prepare an advance directive: ¨ Who do you want to make decisions about your medical care if you are not able to? Many people choose a family member or close friend. ¨ Do you know enough about life support methods that might be used? If not, talk to your doctor so you understand. ¨ What are you most afraid of that might happen? You might be afraid of having pain, losing your independence, or being kept alive by machines. ¨ Where would you prefer to die? Choices include your home, a hospital, or a nursing home. ¨ Would you like to have information about hospice care to support you and your family? ¨ Do you want to donate organs when you die? ¨ Do you want certain Moravian practices performed before you die? If so, put your wishes in the advance directive. · Read your advance directive every year, and make changes as needed. When should you call for help? Be sure to contact your doctor if you have any questions. Where can you learn more? Go to http://trent-karen.info/. Enter R264 in the search box to learn more about \"Advance Directives: Care Instructions. \" Current as of: November 17, 2016 Content Version: 11.2 © 0646-8032 [employer]. Care instructions adapted under license by [employer] (which disclaims liability or warranty for this information).  If you have questions about a medical condition or this instruction, always ask your healthcare professional. [address] any warranty or liability for your use of this information. Introducing \[facility]\"" & HEALTH SERVICES! [facility] introduces Qustodian patient portal. Now you can access parts of your medical record, email your doctor's office, and request medication refills online. 1. In your internet browser, go to [email or web address] 2. Click on the First Time User? Click Here link in the Sign In box. You will see the New Member Sign Up page. 3. Enter your Qustodian Access Code exactly as it appears below. You will not need to use this code after youve completed the sign-up process. If you do not sign up before the expiration date, you must request a new code. · Qustodian Access Code: [email or web address] Expires: 4/18/2017  2:35 PM 
 
4. Enter the last four digits of your Social Security Number (xxxx) and Date of Birth (mm/dd/yyyy) as indicated and click Submit. You will be taken to the next sign-up page. 5. Create a Qustodian ID. This will be your Qustodian login ID and cannot be changed, so think of one that is secure and easy to remember. 6. Create a Qustodian password. You can change your password at any time. 7. Enter your Password Reset Question and Answer. This can be used at a later time if you forget your password. 8. Enter your e-mail address. You will receive e-mail notification when new information is available in [address] 9. Click Sign Up. You can now view and download portions of your medical record. 10. Click the Download Summary menu link to download a portable copy of your medical information. If you have questions, please visit the Frequently Asked Questions section of the Qustodian website. Remember, Qustodian is NOT to be used for urgent needs. For medical emergencies, dial 911. Now available from your iPhone and Android! Please provide this summary of care documentation to your next provider. Your primary care clinician is [facility] as [facility].  If you have any questions after today's visit, please call [phone].

## 2017-04-17 NOTE — PATIENT INSTRUCTIONS
[facility]   Statement to Patients  April 1, 2014      In an effort to ensure the large volume of patient prescription refills is processed in the most efficient and expeditious manner, we are asking our patients to assist us by calling your Pharmacy for all prescription refills, this will include also your  Mail Order Pharmacy. The pharmacy will contact our office electronically to continue the refill process. Please do not wait until the last minute to call your pharmacy. We need at least 48 hours (2days) to fill prescriptions. We also encourage you to call your pharmacy before going to  your prescription to make sure it is ready. With regard to controlled substance prescription refill requests (narcotic refills) that need to be picked up at our office, we ask your cooperation by providing us with at least 72 hours (3days) notice that you will need a refill. We will not refill narcotic prescription refill requests after 4:00pm on any weekday, Monday through Thursday, or after 2:00pm on Fridays, or on the weekends. We encourage everyone to explore another way of getting your prescription refill request processed using Waveseis, our patient web portal through our electronic medical record system. Waveseis is an efficient and effective way to communicate your medication request directly to the office and  downloadable as an jonathan on your smart phone . Waveseis also features a review functionality that allows you to view your medication list as well as leave messages for your physician. Are you ready to get connected? If so please review the attatched instructions or speak to any of our staff to get you set up right away! Thank you so much for your cooperation. Should you have any questions please contact our Practice Administrator. The Physicians and Staff,  [facility] [address]  What is a living will?   A living will is a legal form you use to write down the kind of care you want at the end of your life. It is used by the health professionals who will treat you if you aren't able to decide for yourself. If you put your wishes in writing, your loved ones and others will know what kind of care you want. They won't need to guess. This can ease your mind and be helpful to others. A living will is not the same as an estate or property will. An estate will explains what you want to happen with your money and property after you die. Is a living will a legal document? A living will is a legal document. Each state has its own laws about living blackman. If you move to another state, make sure that your living will is legal in the state where you now live. Or you might use a universal form that has been approved by many states. This kind of form can sometimes be completed and stored online. Your electronic copy will then be available wherever you have a connection to the Internet. In most cases, doctors will respect your wishes even if you have a form from a different state. · You don't need an  to complete a living will. But legal advice can be helpful if your state's laws are unclear, your health history is complicated, or your family can't agree on what should be in your living will. · You can change your living will at any time. Some people find that their wishes about end-of-life care change as their health changes. · In addition to making a living will, think about completing a medical power of  form. This form lets you name the person you want to make end-of-life treatment decisions for you (your \"health care agent\") if you're not able to. Many hospitals and nursing homes will give you the forms you need to complete a living will and a medical power of . · Your living will is used only if you can't make or communicate decisions for yourself anymore.  If you become able to make decisions again, you can accept or refuse any treatment, no matter what you wrote in your living will. · Your state may offer an online registry. This is a place where you can store your living will online so the doctors and nurses who need to treat you can find it right away. What should you think about when creating a living will? Talk about your end-of-life wishes with your family members and your doctor. Let them know what you want. That way the people making decisions for you won't be surprised by your choices. Think about these questions as you make your living will:  · Do you know enough about life support methods that might be used? If not, talk to your doctor so you know what might be done if you can't breathe on your own, your heart stops, or you're unable to swallow. · What things would you still want to be able to do after you receive life-support methods? Would you want to be able to walk? To speak? To eat on your own? To live without the help of machines? · If you have a choice, where do you want to be cared for? In your home? At a hospital or nursing home? · Do you want certain Amish practices performed if you become very ill? · If you have a choice at the end of your life, where would you prefer to die? At home? In a hospital or nursing home? Somewhere else? · Would you prefer to be buried or cremated? · Do you want your organs to be donated after you die? What should you do with your living will? · Make sure that your family members and your health care agent have copies of your living will. · Give your doctor a copy of your living will to keep in your medical record. If you have more than one doctor, make sure that each one has a copy. · You may want to put a copy of your living will where it can be easily found. Where can you learn more? Go to http://trent-karen.info/. Enter J440 in the search box to learn more about \"Learning About Living Ryne. \"  Current as of: February 24, 2016  Content Version: 11.2  © 4707-8763 [employer]. Care instructions adapted under license by [employer] (which disclaims liability or warranty for this information). If you have questions about a medical condition or this instruction, always ask your healthcare professional. [facility] [address] any warranty or liability for your use of this information. Advance Directives: Care Instructions  Your Care Instructions  An advance directive is a legal way to state your wishes at the end of your life. It tells your family and your doctor what to do if you can no longer say what you want. There are two main types of advance directives. You can change them any time that your wishes change. · A living will tells your family and your doctor your wishes about life support and other treatment. · A durable power of  for health care lets you name a person to make treatment decisions for you when you can't speak for yourself. This person is called a health care agent. If you do not have an advance directive, decisions about your medical care may be made by a doctor or a  who doesn't know you. It may help to think of an advance directive as a gift to the people who care for you. If you have one, they won't have to make tough decisions by themselves. Follow-up care is a key part of your treatment and safety. Be sure to make and go to all appointments, and call your doctor if you are having problems. It's also a good idea to know your test results and keep a list of the medicines you take. How can you care for yourself at home? · Discuss your wishes with your loved ones and your doctor. This way, there are no surprises. · Many states have a unique form. Or you might use a universal form that has been approved by many states. This kind of form can sometimes be completed and stored online.  Your electronic copy will then be available wherever you have a connection to the Internet. In most cases, doctors will respect your wishes even if you have a form from a different state. · You don't need a  to do an advance directive. But you may want to get legal advice. · Think about these questions when you prepare an advance directive:  ¨ Who do you want to make decisions about your medical care if you are not able to? Many people choose a family member or close friend. ¨ Do you know enough about life support methods that might be used? If not, talk to your doctor so you understand. ¨ What are you most afraid of that might happen? You might be afraid of having pain, losing your independence, or being kept alive by machines. ¨ Where would you prefer to die? Choices include your home, a hospital, or a nursing home. ¨ Would you like to have information about hospice care to support you and your family? ¨ Do you want to donate organs when you die? ¨ Do you want certain Druze practices performed before you die? If so, put your wishes in the advance directive. · Read your advance directive every year, and make changes as needed. When should you call for help? Be sure to contact your doctor if you have any questions. Where can you learn more? Go to http://trent-karen.info/. Enter R264 in the search box to learn more about \"Advance Directives: Care Instructions. \"  Current as of: November 17, 2016  Content Version: 11.2  © 3313-2463 [employer]. Care instructions adapted under license by Dwell[employer] (which disclaims liability or warranty for this information). If you have questions about a medical condition or this instruction, always ask your healthcare professional. [address] any warranty or liability for your use of this information.

## 2017-04-17 NOTE — PROGRESS NOTES
Neftaly Guadarrama is a 64 y.o. male who presents with the following  Chief Complaint   Patient presents with    Seizure     last know 11-12 yrs ago    Dizziness       HPI Patient comes in for a follow up for changing seizure medications and dizziness. Fully switched to Aptiom from [address] as the [address] was causing bad GI upset and now since this has gotten a lot better. Been off the Keppra for about 2 weeks now. Feeling more energy also. Is noticing some day to day headaches but usually relieved by advil. Feels like he can get some positional dizziness also but this is not all the time. Overall he is feeling good and has not noticed any seizures or changes. Migraines are the same. Zomig nasal knocks out as they begin. Allergies   Allergen Reactions    Levetiracetam Other (comments)     Generic form causes severe Headache       Current Outpatient Prescriptions   Medication Sig    aspirin delayed-release 81 mg tablet Take  by mouth daily.  eslicarbazepine (APTIOM) 800 mg tab Take 1 Tab by mouth daily.  ZOLMitriptan (ZOMIG) 5 mg nasal solution 1 spray in nostril at HA onset. May repeat again in 2 hours X 1 dose. No mor then 2 doses in 24 hours    olmesartan (BENICAR) 20 mg tablet Take 20 mg by mouth daily. No current facility-administered medications for this visit.         History   Smoking Status    Former Smoker   Smokeless Tobacco    Never Used       Past Medical History:   Diagnosis Date    Hypertension     Migraines     [facility] [facility]     Stroke ([facility] [address] [facility]        Past Surgical History:   Procedure Laterality Date    HX ACL RECONSTRUCTION      right knee 6 times       Family History   Problem Relation Age of Onset    Cancer Father      prostate    Stroke Maternal Aunt     Diabetes Sister        Social History     Social History    Marital status:      Spouse name: N/A    Number of children: N/A    Years of education: N/A     Social History Main Topics    Smoking status: Former Smoker    Smokeless tobacco: Never Used    Alcohol use Yes      Comment: rarely    Drug use: No    Sexual activity: Not Asked     Other Topics Concern    None     Social History Narrative       ROS    Remainder of comprehensive review is negative. Physical Exam :    Visit Vitals    /64    Resp 20    Ht 6' 2.21\" (1.885 m)    Wt 115.7 kg (255 lb)    BMI 32.56 kg/m2       General: Well defined, nourished, and groomed individual in no acute distress.    Neck: Supple, nontender, no bruits, no pain with resistance to active range of motion.    Heart: Regular rate and rhythm, no murmurs, rub, or gallop. Normal S1S2. Lungs: Clear to auscultation bilaterally with equal chest expansion, no cough, no wheeze  Musculoskeletal: Extremities revealed no edema and had full range of motion of joints.    Psych: Good mood and bright affect    NEUROLOGICAL EXAMINATION:    Mental Status: Alert and oriented to person, place, and time    Cranial Nerves:    II, III, IV, VI: Visual acuity grossly intact. Visual fields are normal.    Pupils are equal, round, and reactive to light and accommodation.    Extra-ocular movements are full and fluid. Fundoscopic exam was benign, no ptosis or nystagmus.    V-XII: Hearing is grossly intact. Facial features are symmetric, with normal sensation and strength. The palate rises symmetrically and the tongue protrudes midline. Sternocleidomastoids 5/5. Motor Examination: Normal tone, bulk, and strength, 5/5 muscle strength throughout. Coordination: Finger to nose was normal. No resting or intention tremor    Gait and Station: Steady while walking. Normal arm swing. No pronator drift. No muscle wasting or fasiculations noted. Reflexes: DTRs 2+ throughout. Raven Tian         Results for orders placed or performed in visit on 01/18/17   CBC WITH AUTOMATED DIFF   Result Value Ref Range    WBC 8.5 3.4 - 10.8 x10E3/uL    RBC 4.60 4.14 - 5.80 x10E6/uL    HGB 13.6 12.6 - 17.7 g/dL    HCT 40.9 37.5 - 51.0 %    MCV 89 79 - 97 fL    MCH 29.6 26.6 - 33.0 pg    MCHC 33.3 31.5 - 35.7 g/dL    RDW 13.7 12.3 - 15.4 %    PLATELET 165 372 - 592 x10E3/uL    NEUTROPHILS 59 %    Lymphocytes 31 %    MONOCYTES 8 %    EOSINOPHILS 2 %    BASOPHILS 0 %    ABS. NEUTROPHILS 5.0 1.4 - 7.0 x10E3/uL    Abs Lymphocytes 2.7 0.7 - 3.1 x10E3/uL    ABS. MONOCYTES 0.7 0.1 - 0.9 x10E3/uL    ABS. EOSINOPHILS 0.2 0.0 - 0.4 x10E3/uL    ABS. BASOPHILS 0.0 0.0 - 0.2 x10E3/uL    IMMATURE GRANULOCYTES 0 %    ABS. IMM. GRANS. 0.0 0.0 - 0.1 V42T2/SM   METABOLIC PANEL, COMPREHENSIVE   Result Value Ref Range    Glucose 70 65 - 99 mg/dL    BUN 13 6 - 24 mg/dL    Creatinine 0.60 (L) 0.76 - 1.27 mg/dL    GFR est non- >59 mL/min/1.73    GFR est  >59 mL/min/1.73    BUN/Creatinine ratio 22 (H) 9 - 20    Sodium 139 134 - 144 mmol/L    Potassium 4.1 3.5 - 5.2 mmol/L    Chloride 100 96 - 106 mmol/L    CO2 25 18 - 29 mmol/L    Calcium 9.1 8.7 - 10.2 mg/dL    Protein, total 6.8 6.0 - 8.5 g/dL    Albumin 4.6 3.5 - 5.5 g/dL    GLOBULIN, TOTAL 2.2 1.5 - 4.5 g/dL    A-G Ratio 2.1 1.1 - 2.5    Bilirubin, total 0.4 0.0 - 1.2 mg/dL    Alk. phosphatase 53 39 - 117 IU/L    AST (SGOT) 23 0 - 40 IU/L    ALT (SGPT) 30 0 - 44 IU/L   LEVETIRACETAM (KEPPRA)   Result Value Ref Range    LEVETIRACETAM, S 17.4 10.0 - 40.0 ug/mL   VITAMIN B12   Result Value Ref Range    Vitamin B12 391 211 - 946 pg/mL       No orders of the defined types were placed in this encounter. No diagnosis found. Follow-up Disposition:  Return in about 4 months (around 8/17/2017). Switched to Aptiom 800 mg. Doing well with some minor headaches and dizziness. As continues to stay on this medication things will hopefully get better. He understands and will monitor this.  His 24 hour EEG was not covered and should have been with his partial epilepsy history and unexplained symptoms that were happening at home and over night at his house we needed this to evaluate him during sleep and over a longer period of recording as his EEG looked good. We will continue zomig nasal as abortive therapy for his migraines. Call with any issues.          Myriam Torres NP        This note will not be viewable in [address]

## 2017-05-22 ENCOUNTER — TELEPHONE (OUTPATIENT)
Dept: NEUROLOGY | Age: 57
End: 2017-05-22

## 2017-05-22 NOTE — TELEPHONE ENCOUNTER
Called and spoke to patients wife she got a EOB for 24 hr eeg and cost is $9000 insurance states it will not cover this but she hasnt gotten a bill yet   Please call

## 2017-05-22 NOTE — TELEPHONE ENCOUNTER
----- Message from José Snyder sent at 5/22/2017  9:42 AM EDT -----  Regarding: MARISSA weber/Telephone  Patient's wife would like a return call at [phone] her  received a bill from the insurance company for a EKG done on 2/2/17 that they will not pay.

## 2017-07-05 ENCOUNTER — TELEPHONE (OUTPATIENT)
Dept: NEUROLOGY | Age: 57
End: 2017-07-05

## 2017-07-05 DIAGNOSIS — G40.209 PARTIAL EPILEPSY WITH IMPAIRMENT OF CONSCIOUSNESS (HCC): Primary | ICD-10-CM

## 2017-07-05 NOTE — TELEPHONE ENCOUNTER
----- Message from Kelly White sent at 7/5/2017  2:58 PM EDT -----  Regarding: MARISSA García/telephone  Pt (p) [phone],CHRISTIANO was returning the nurses call.

## 2017-07-05 NOTE — TELEPHONE ENCOUNTER
----- Message from Douglas Castillo sent at 7/5/2017  9:35 AM EDT -----  Regarding: MARISSA García/Telephone/Refill  Contact: [phone]  Jordi Maia Gomez's spouse Esthela Andrade is requesting to have the Rx for Aptium written as Aptium 800mg 1/d for 30 days at a time. Instead of the current direction. The new direction will be more economical with the insurance company for the pt. Please also note the pt has 2 dosages left of the medication. Please contact upon completion.  Secondary contact is [phone]

## 2017-07-18 ENCOUNTER — TELEPHONE (OUTPATIENT)
Dept: NEUROLOGY | Age: 57
End: 2017-07-18

## 2017-07-18 NOTE — TELEPHONE ENCOUNTER
Pt is having anxiety, and rash from aptiom. He is getting feelings of anger and it is escalating and cause 2 physical altercations. He want to talk to matty KHALIL about this.

## 2017-07-19 DIAGNOSIS — G40.209 PARTIAL EPILEPSY WITH IMPAIRMENT OF CONSCIOUSNESS (HCC): Primary | ICD-10-CM

## 2017-07-19 RX ORDER — LACOSAMIDE 100 MG/1
100 TABLET ORAL 2 TIMES DAILY
Qty: 60 TAB | Refills: 5 | Status: SHIPPED | OUTPATIENT
Start: 2017-07-19 | End: 2017-08-21

## 2017-07-19 NOTE — TELEPHONE ENCOUNTER
Called and spoke to pt. Pt states that he broke out in a really bad rash when he had taken aptiom. PCP told him that aptiom was the cause. Pt states that he has been getting angry over nothing and has had bad abdomen pain as well. Pt's appetite is gone. He feels that they are all side effects of aptiom. Pt is asking if there is another medication he can try or if there is a way he can manage the side effects.

## 2017-07-20 ENCOUNTER — TELEPHONE (OUTPATIENT)
Dept: NEUROLOGY | Age: 57
End: 2017-07-20

## 2017-07-20 NOTE — TELEPHONE ENCOUNTER
Rash came up 3-4 weeks. Pt states that pharmacy called him to say insurance does not want to fill the medication. Pt states he will be in Monday morning for samples.                If it I sa rash just coming up now it should not be hte aptiom as he has been on for  A while  Now. We will switch to Vimpat up to 100 mg BID     Continue Aptiom and start Vimpat 50 mg BID X 1 week     Then decrease Aptiom to 600 mg X 1 week as we increase Vimpat to 100 mg BID     Then decrease Aptiom to 400 mg X 1 week then stop       Continue VImpat 100 mg BID throughout this     Can come get aptiom samples of the 400 and 600 mg     And Vimpat 50 mg samples to help.  Will print 100 mg BID

## 2017-07-25 ENCOUNTER — TELEPHONE (OUTPATIENT)
Dept: NEUROLOGY | Age: 57
End: 2017-07-25

## 2017-07-26 NOTE — TELEPHONE ENCOUNTER
Pt states that he went to  vimpat but after insurance it cost $800 and he cannot afford that. Pt states he does not want to be in the position to have to choose to eat or buy medicine. He is asking if there is anything that can be prescribed that will not cost so much or if there is anything else he can do.

## 2017-07-31 NOTE — TELEPHONE ENCOUNTER
Called and spoke to pt. Pt states that he will come by today to  co-pay card, but he wants NP to know that after 2 months his insurance requires a 3 month supply to be ordered and the copay card will not cover that.

## 2017-07-31 NOTE — TELEPHONE ENCOUNTER
Attempted to contact pt. Left message on  for return call.      Per NP:    He needs to come get a copay card     It should be 20 $ only     If not let me know thanks  (Routing comment)

## 2017-08-02 RX ORDER — LACOSAMIDE 50 MG/1
50 TABLET ORAL 2 TIMES DAILY
Qty: 14 TAB | Refills: 0 | Status: SHIPPED | COMMUNITY
Start: 2017-08-02 | End: 2017-08-21

## 2017-08-04 NOTE — TELEPHONE ENCOUNTER
Called and spoke to pt. Pt has not gotten up here to  copay card. He is not sure he wants to take the vimpat because he does not know if he will be able to afford it without the copay card once insurance wants the 3 month supply to be written. Pt states he will call his insurance to figure out where he stands with the vimpat prescription. Once he knows whether or not he wants to take vimpat he will let NP know. Per NP:  Meme states his insurance should cover it under a 3 months supply and should not charge that after looking at his stuff hse said.        They just want to make sure he gets a 3 months uspply of it  (Routing comment)

## 2017-08-11 ENCOUNTER — TELEPHONE (OUTPATIENT)
Dept: NEUROLOGY | Age: 57
End: 2017-08-11

## 2017-08-11 NOTE — TELEPHONE ENCOUNTER
Pt is unable to afford the new RX matty wanted to put him on (Vimpat) . Please call. They are requesting a medical consultation. The aptiom was not working but they cannot afford vimpat.

## 2017-08-17 NOTE — TELEPHONE ENCOUNTER
Called and spoke to pt's wife. Pt's wife states that pt was on aptiom for about 3 months and was having side effects(anger, anxiety, diarrhea, skin issues) while on this medication. NP changed medication to vimpat, but that cost $800/month.  gave pt co-pay card, but that only helps for 2 months per pt's wife. Pt's wife is asking if there is another option in place of both medications. Something that is affordable. Pt's wife would like a call back today. She does not want us to call pt because he may not hear his phone ring.

## 2017-08-21 DIAGNOSIS — G40.209 PARTIAL EPILEPSY WITH IMPAIRMENT OF CONSCIOUSNESS (HCC): Primary | ICD-10-CM

## 2017-08-21 RX ORDER — DIVALPROEX SODIUM 500 MG/1
TABLET, EXTENDED RELEASE ORAL
Qty: 90 TAB | Refills: 5 | Status: SHIPPED | OUTPATIENT
Start: 2017-08-21 | End: 2017-08-30

## 2017-08-21 NOTE — TELEPHONE ENCOUNTER
----- Message from [phone] sent at 8/21/2017  9:54 AM EDT -----  Regarding: MARISSA García/Telephone  Contact: [phone]  Sade Gomez's spouse Timo Macer is calling regarding an issue that has been unresolved for a month. They have requested that the pt be taken off of the Attium and rx for an alternative is requested. Marveen Levels is not an option. Send a new rx to the [facility] on file. Call is requested for 8/21/2017 befpre 230.

## 2017-08-21 NOTE — TELEPHONE ENCOUNTER
Pt's wife came in the office. States Pt has had side effects of every medication he has tried for seizures. Depakote: leached calcium, lost 2\" in height, break through seizures    Keppra: breakthrough seizures    Aptiom: anger    Vimpat: too expensive    Pt is now on a gluten free diet.  Follow up appt to discuss medications scheduled for Friday September 1, 2017 @3:30pm

## 2017-08-30 ENCOUNTER — OFFICE VISIT (OUTPATIENT)
Dept: NEUROLOGY | Age: 57
End: 2017-08-30

## 2017-08-30 VITALS
DIASTOLIC BLOOD PRESSURE: 78 MMHG | SYSTOLIC BLOOD PRESSURE: 132 MMHG | BODY MASS INDEX: 34.91 KG/M2 | HEIGHT: 74 IN | WEIGHT: 272 LBS

## 2017-08-30 DIAGNOSIS — G40.209 PARTIAL EPILEPSY WITH IMPAIRMENT OF CONSCIOUSNESS (HCC): Primary | ICD-10-CM

## 2017-08-30 NOTE — PROGRESS NOTES
Pt states that while on depakote previously (2006) he had a breakthrough seizure. On keppra pt had shakiness and short term memory loss. Aptiom he had feelings of anxiety and depression along with anger. Vimpat is too expensive for pt to afford. Pt is required to get a 3-month prescription. Pt is accompanied by his wife.

## 2017-08-30 NOTE — PROGRESS NOTES
Evon Toro is a 64 y.o. male who presents with the following  Chief Complaint   Patient presents with    Medication Evaluation       HPI Patient comes in for a follow up for partial epilepsy. Currently on Aptiom 800 mg daily. Was on Keppra with side effects. Depakote with side effects and a big seizure. Vimpat most recently was 800$ so he never picked this up and did not get placed on this due to that. He is having side effects to Aptiom. Rash when out in the sun, mood changes, agitation, aggression, just does not feel right on this medication and the wife agrees. He wants to switch to something different. He has not had any seizures but just does not feel like himself on this medication. Headaches are stable. Allergies   Allergen Reactions    Aptiom [Eslicarbazepine] Anxiety and Other (comments)     Pt reports anger and sores as a reaction to aptiom.  Levetiracetam Other (comments)     Generic form causes severe Headache       Current Outpatient Prescriptions   Medication Sig    eslicarbazepine (APTIOM) 800 mg tab Take  by mouth.  topiramate ER (TROKENDI XR) 200 mg capsule Take 1 capsule by mouth nightly    aspirin delayed-release 81 mg tablet Take  by mouth daily.  ZOLMitriptan (ZOMIG) 5 mg nasal solution 1 spray in nostril at HA onset. May repeat again in 2 hours X 1 dose. No mor then 2 doses in 24 hours    olmesartan (BENICAR) 20 mg tablet Take 20 mg by mouth daily. No current facility-administered medications for this visit.         History   Smoking Status    Former Smoker   Smokeless Tobacco    Never Used       Past Medical History:   Diagnosis Date    Hypertension     Migraines     Seizures ([address]     Stroke [facility])        Past Surgical History:   Procedure Laterality Date    HX ACL RECONSTRUCTION      right knee 6 times       Family History   Problem Relation Age of Onset    Cancer Father      prostate    Stroke Maternal Aunt     Diabetes Sister        Social History Social History    Marital status:      Spouse name: N/A    Number of children: N/A    Years of education: N/A     Social History Main Topics    Smoking status: Former Smoker    Smokeless tobacco: Never Used    Alcohol use Yes      Comment: rarely    Drug use: No    Sexual activity: Not Asked     Other Topics Concern    None     Social History Narrative       Review of Systems   HENT: Negative for ear pain, hearing loss and tinnitus. Eyes: Negative for blurred vision, double vision and photophobia. Respiratory: Negative for shortness of breath and wheezing. Cardiovascular: Negative for chest pain and palpitations. Gastrointestinal: Negative for nausea and vomiting. Neurological: Positive for dizziness. Negative for tingling, seizures, loss of consciousness, weakness and headaches. Remainder of comprehensive review is negative. Physical Exam :    Visit Vitals    /78    Ht 6' 2\" (1.88 m)    Wt 123.4 kg (272 lb)    BMI 34.92 kg/m2       General: Well defined, nourished, and groomed individual in no acute distress.    Neck: Supple, nontender, no bruits, no pain with resistance to active range of motion.    Heart: Regular rate and rhythm, no murmurs, rub, or gallop. Normal S1S2. Lungs: Clear to auscultation bilaterally with equal chest expansion, no cough, no wheeze  Musculoskeletal: Extremities revealed no edema and had full range of motion of joints.    Psych: Good mood and bright affect    NEUROLOGICAL EXAMINATION:    Mental Status: Alert and oriented to person, place, and time    Cranial Nerves:    II, III, IV, VI: Visual acuity grossly intact. Visual fields are normal.    Pupils are equal, round, and reactive to light and accommodation.    Extra-ocular movements are full and fluid. Fundoscopic exam was benign, no ptosis or nystagmus.    V-XII: Hearing is grossly intact. Facial features are symmetric, with normal sensation and strength.  The palate rises symmetrically and the tongue protrudes midline. Sternocleidomastoids 5/5. Motor Examination: Normal tone, bulk, and strength, 5/5 muscle strength throughout. Coordination: Finger to nose was normal. No resting or intention tremor    Gait and Station: Steady while walking. Normal arm swing. No pronator drift. No muscle wasting or fasiculations noted. Reflexes: DTRs 2+ throughout. Results for orders placed or performed in visit on 01/18/17   CBC WITH AUTOMATED DIFF   Result Value Ref Range    WBC 8.5 3.4 - 10.8 x10E3/uL    RBC 4.60 4.14 - 5.80 x10E6/uL    HGB 13.6 12.6 - 17.7 g/dL    HCT 40.9 37.5 - 51.0 %    MCV 89 79 - 97 fL    MCH 29.6 26.6 - 33.0 pg    MCHC 33.3 31.5 - 35.7 g/dL    RDW 13.7 12.3 - 15.4 %    PLATELET 837 435 - 837 x10E3/uL    NEUTROPHILS 59 %    Lymphocytes 31 %    MONOCYTES 8 %    EOSINOPHILS 2 %    BASOPHILS 0 %    ABS. NEUTROPHILS 5.0 1.4 - 7.0 x10E3/uL    Abs Lymphocytes 2.7 0.7 - 3.1 x10E3/uL    ABS. MONOCYTES 0.7 0.1 - 0.9 x10E3/uL    ABS. EOSINOPHILS 0.2 0.0 - 0.4 x10E3/uL    ABS. BASOPHILS 0.0 0.0 - 0.2 x10E3/uL    IMMATURE GRANULOCYTES 0 %    ABS. IMM. GRANS. 0.0 0.0 - 0.1 R87E6/YH   METABOLIC PANEL, COMPREHENSIVE   Result Value Ref Range    Glucose 70 65 - 99 mg/dL    BUN 13 6 - 24 mg/dL    Creatinine 0.60 (L) 0.76 - 1.27 mg/dL    GFR est non- >59 mL/min/1.73    GFR est  >59 mL/min/1.73    BUN/Creatinine ratio 22 (H) 9 - 20    Sodium 139 134 - 144 mmol/L    Potassium 4.1 3.5 - 5.2 mmol/L    Chloride 100 96 - 106 mmol/L    CO2 25 18 - 29 mmol/L    Calcium 9.1 8.7 - 10.2 mg/dL    Protein, total 6.8 6.0 - 8.5 g/dL    Albumin 4.6 3.5 - 5.5 g/dL    GLOBULIN, TOTAL 2.2 1.5 - 4.5 g/dL    A-G Ratio 2.1 1.1 - 2.5    Bilirubin, total 0.4 0.0 - 1.2 mg/dL    Alk.  phosphatase 53 39 - 117 IU/L    AST (SGOT) 23 0 - 40 IU/L    ALT (SGPT) 30 0 - 44 IU/L   LEVETIRACETAM (KEPPRA)   Result Value Ref Range    LEVETIRACETAM, S 17.4 10.0 - 40.0 ug/mL   VITAMIN B12   Result Value Ref Range    Vitamin B12 391 211 - 946 pg/mL       Orders Placed This Encounter    EEG AMB NEURO ([id number])     Standing Status:   Future     Standing Expiration Date:   2/28/2018     Order Specific Question:   Reason for Exam:     Answer:   seizures    eslicarbazepine (APTIOM) 800 mg tab     Sig: Take  by mouth.  topiramate ER (TROKENDI XR) 200 mg capsule     Sig: Take 1 capsule by mouth nightly     Dispense:  30 Cap     Refill:  5       1. Partial epilepsy with impairment of consciousness ([facility] [address]        Follow-up Disposition:  Return in about 2 months (around 10/30/2017). Partial epilepsy. Tried  Aptiom, Keppra, Depakote. We will try Trokendi XR as for prevention of seizures due to partial epilepsy. We will titrate off Aptiom over the next few week and onto this at 200 mg nightly. We discussed this and side effects and he has no questions. We will then get an EEG  A few weeks in to make sure we do not see any abnormalities. Call with any issues. If need be due to pricing can go to topamax BID. But for better prevention and FDA indication Trokendi XR for now.          This note will not be viewable in [email or web address]

## 2017-08-30 NOTE — PATIENT INSTRUCTIONS
[facility]   Statement to Patients  April 1, 2014      In an effort to ensure the large volume of patient prescription refills is processed in the most efficient and expeditious manner, we are asking our patients to assist us by calling your Pharmacy for all prescription refills, this will include also your  Mail Order Pharmacy. The pharmacy will contact our office electronically to continue the refill process. Please do not wait until the last minute to call your pharmacy. We need at least 48 hours (2days) to fill prescriptions. We also encourage you to call your pharmacy before going to  your prescription to make sure it is ready. With regard to controlled substance prescription refill requests (narcotic refills) that need to be picked up at our office, we ask your cooperation by providing us with at least 72 hours (3days) notice that you will need a refill. We will not refill narcotic prescription refill requests after 4:00pm on any weekday, Monday through Thursday, or after 2:00pm on Fridays, or on the weekends. We encourage everyone to explore another way of getting your prescription refill request processed using [employer], our patient web portal through our electronic medical record system. [employer] is an efficient and effective way to communicate your medication request directly to the office and  downloadable as an jonathan on your smart phone . [employer] also features a review functionality that allows you to view your medication list as well as leave messages for your physician. Are you ready to get connected? If so please review the attatched instructions or speak to any of our staff to get you set up right away! Thank you so much for your cooperation. Should you have any questions please contact our Practice Administrator. The Physicians and Staff,  [facility] [id number] [address]  What is a living will?   A living will is a legal form you use to write down the kind of care you want at the end of your life. It is used by the health professionals who will treat you if you aren't able to decide for yourself. If you put your wishes in writing, your loved ones and others will know what kind of care you want. They won't need to guess. This can ease your mind and be helpful to others. A living will is not the same as an estate or property will. An estate will explains what you want to happen with your money and property after you die. Is a living will a legal document? A living will is a legal document. Each state has its own laws about living blackman. If you move to another state, make sure that your living will is legal in the state where you now live. Or you might use a universal form that has been approved by many states. This kind of form can sometimes be completed and stored online. Your electronic copy will then be available wherever you have a connection to the Internet. In most cases, doctors will respect your wishes even if you have a form from a different state. · You don't need an  to complete a living will. But legal advice can be helpful if your state's laws are unclear, your health history is complicated, or your family can't agree on what should be in your living will. · You can change your living will at any time. Some people find that their wishes about end-of-life care change as their health changes. · In addition to making a living will, think about completing a medical power of  form. This form lets you name the person you want to make end-of-life treatment decisions for you (your \"health care agent\") if you're not able to. Many hospitals and nursing homes will give you the forms you need to complete a living will and a medical power of . · Your living will is used only if you can't make or communicate decisions for yourself anymore.  If you become able to make decisions again, you can accept or refuse any treatment, no matter what you wrote in your living will. · Your state may offer an online registry. This is a place where you can store your living will online so the doctors and nurses who need to treat you can find it right away. What should you think about when creating a living will? Talk about your end-of-life wishes with your family members and your doctor. Let them know what you want. That way the people making decisions for you won't be surprised by your choices. Think about these questions as you make your living will:  · Do you know enough about life support methods that might be used? If not, talk to your doctor so you know what might be done if you can't breathe on your own, your heart stops, or you're unable to swallow. · What things would you still want to be able to do after you receive life-support methods? Would you want to be able to walk? To speak? To eat on your own? To live without the help of machines? · If you have a choice, where do you want to be cared for? In your home? At a hospital or nursing home? · Do you want certain Oriental orthodox practices performed if you become very ill? · If you have a choice at the end of your life, where would you prefer to die? At home? In a hospital or nursing home? Somewhere else? · Would you prefer to be buried or cremated? · Do you want your organs to be donated after you die? What should you do with your living will? · Make sure that your family members and your health care agent have copies of your living will. · Give your doctor a copy of your living will to keep in your medical record. If you have more than one doctor, make sure that each one has a copy. · You may want to put a copy of your living will where it can be easily found. Where can you learn more? Go to http://trent-karen.info/. Enter W631 in the search box to learn more about \"Learning About Living Juan Francisco Goldberg. \"  Current as of: August 8, 2016  Content Version: 11.3  © 6802-1308 [employer]. Care instructions adapted under license by [employer] (which disclaims liability or warranty for this information). If you have questions about a medical condition or this instruction, always ask your healthcare professional. [facility] [address] any warranty or liability for your use of this information. Advance Directives: Care Instructions  Your Care Instructions  An advance directive is a legal way to state your wishes at the end of your life. It tells your family and your doctor what to do if you can no longer say what you want. There are two main types of advance directives. You can change them any time that your wishes change. · A living will tells your family and your doctor your wishes about life support and other treatment. · A durable power of  for health care lets you name a person to make treatment decisions for you when you can't speak for yourself. This person is called a health care agent. If you do not have an advance directive, decisions about your medical care may be made by a doctor or a  who doesn't know you. It may help to think of an advance directive as a gift to the people who care for you. If you have one, they won't have to make tough decisions by themselves. Follow-up care is a key part of your treatment and safety. Be sure to make and go to all appointments, and call your doctor if you are having problems. It's also a good idea to know your test results and keep a list of the medicines you take. How can you care for yourself at home? · Discuss your wishes with your loved ones and your doctor. This way, there are no surprises. · Many states have a unique form. Or you might use a universal form that has been approved by many states. This kind of form can sometimes be completed and stored online.  Your electronic copy will then be available wherever you have a connection to the Internet. In most cases, doctors will respect your wishes even if you have a form from a different state. · You don't need a  to do an advance directive. But you may want to get legal advice. · Think about these questions when you prepare an advance directive:  ¨ Who do you want to make decisions about your medical care if you are not able to? Many people choose a family member or close friend. ¨ Do you know enough about life support methods that might be used? If not, talk to your doctor so you understand. ¨ What are you most afraid of that might happen? You might be afraid of having pain, losing your independence, or being kept alive by machines. ¨ Where would you prefer to die? Choices include your home, a hospital, or a nursing home. ¨ Would you like to have information about hospice care to support you and your family? ¨ Do you want to donate organs when you die? ¨ Do you want certain Presybeterian practices performed before you die? If so, put your wishes in the advance directive. · Read your advance directive every year, and make changes as needed. When should you call for help? Be sure to contact your doctor if you have any questions. Where can you learn more? Go to http://trentU.S. Healthworkskaren.info/. Enter R264 in the search box to learn more about \"Advance Directives: Care Instructions. \"  Current as of: November 17, 2016  Content Version: 11.3  © 2996-5830 [employer]. Care instructions adapted under license by [employer] (which disclaims liability or warranty for this information). If you have questions about a medical condition or this instruction, always ask your healthcare professional. [place] [address] any warranty or liability for your use of this information. Week 1. Continue Aptiom 800 mg daily. Start Trokendi XR 25 mg daily   Week 2 . Continue Aptiom 800 mg daily. Start Trokendi XR 50 mg daily   Week 3. Continue Aptiom 800 mg daily. Start Troekdni  mg daily   Week 4. Keep 800 mg aptiom daily. Start Trokendi  mg daily   Week 5. Drop to 400 mg aptiom daily. Continue Trokendi 200 mg daily  Week 6. Stop Aptiom completely .  Continue Trokendi  mg daily

## 2017-08-30 NOTE — MR AVS SNAPSHOT
Visit Information Date & Time Provider Department Dept. Phone Encounter #  
 8/30/2017  3:00 PM Deane Duane, NP [facility] [phone] [id number] Follow-up Instructions Return in about 2 months (around 10/30/2017). Upcoming Health Maintenance Date Due Hepatitis C Screening 1960 DTaP/Tdap/Td series (1 - Tdap) 12/12/1981 FOBT Q 1 YEAR AGE 50-75 12/12/2010 INFLUENZA AGE 9 TO ADULT 8/1/2017 Allergies as of 8/30/2017  Review Complete On: 8/30/2017 By: Jennifer Torres Severity Noted Reaction Type Reactions Aptiom [Eslicarbazepine]  91/56/7761    Anxiety, Other (comments) Pt reports anger and sores as a reaction to aptiom. Levetiracetam  08/16/2012    Other (comments) Generic form causes severe Headache Current Immunizations  Never Reviewed No immunizations on file. Not reviewed this visit You Were Diagnosed With   
  
 Codes Comments Partial epilepsy with impairment of consciousness ([facility]    -  Primary ICD-10-CM: E48.364 ICD-9-CM: 345.40 Vitals BP Height(growth percentile) Weight(growth percentile) BMI Smoking Status 132/78 6' 2\" (1.88 m) 272 lb (123.4 kg) 34.92 kg/m2 Former Smoker Vitals History BMI and BSA Data Body Mass Index Body Surface Area 34.92 kg/m 2 2.54 m 2 Preferred Pharmacy Pharmacy Name Phone [id number] [address] [phone] Your Updated Medication List  
  
   
This list is accurate as of: 8/30/17  3:56 PM.  Always use your most recent med list.  
  
  
  
  
 APTIOM 800 mg Tab Generic drug:  eslicarbazepine Take  by mouth. aspirin delayed-release 81 mg tablet Take  by mouth daily. BENICAR 20 mg tablet Generic drug:  olmesartan Take 20 mg by mouth daily. topiramate  mg capsule Commonly known as:  TROKENDI XR  
 Take 1 capsule by mouth nightly ZOLMitriptan 5 mg nasal solution Commonly known as:  ZOMIG  
1 spray in nostril at HA onset. May repeat again in 2 hours X 1 dose. No mor then 2 doses in 24 hours Prescriptions Sent to Pharmacy Refills  
 topiramate ER (TROKENDI XR) 200 mg capsule 5 Sig: Take 1 capsule by mouth nightly Class: Normal  
 Pharmacy: [facility]/pharmacy #[id number]- [address], [address] ([address] [address]) Ph #: [phone] Follow-up Instructions Return in about 2 months (around 10/30/2017). To-Do List   
 08/30/2017 Neurology:  EEG AMB NEURO Patient Instructions PRESCRIPTION REFILL POLICY [facility] Statement to Patients April 1, 2014 In an effort to ensure the large volume of patient prescription refills is processed in the most efficient and expeditious manner, we are asking our patients to assist us by calling your Pharmacy for all prescription refills, this will include also your  Mail Order Pharmacy. The pharmacy will contact our office electronically to continue the refill process. Please do not wait until the last minute to call your pharmacy. We need at least 48 hours (2days) to fill prescriptions. We also encourage you to call your pharmacy before going to  your prescription to make sure it is ready. With regard to controlled substance prescription refill requests (narcotic refills) that need to be picked up at our office, we ask your cooperation by providing us with at least 72 hours (3days) notice that you will need a refill. We will not refill narcotic prescription refill requests after 4:00pm on any weekday, Monday through Thursday, or after 2:00pm on Fridays, or on the weekends.   
  
We encourage everyone to explore another way of getting your prescription refill request processed using [employer], our patient web portal through our electronic medical record system. Eterniam is an efficient and effective way to communicate your medication request directly to the office and  downloadable as an jonathan on your smart phone . [employer]iam also features a review functionality that allows you to view your medication list as well as leave messages for your physician. Are you ready to get connected? If so please review the attatched instructions or speak to any of our staff to get you set up right away! Thank you so much for your cooperation. Should you have any questions please contact our Practice Administrator. The Physicians and Staff,  [facility] [address] What is a living will? A living will is a legal form you use to write down the kind of care you want at the end of your life. It is used by the health professionals who will treat you if you aren't able to decide for yourself. If you put your wishes in writing, your loved ones and others will know what kind of care you want. They won't need to guess. This can ease your mind and be helpful to others. A living will is not the same as an estate or property will. An estate will explains what you want to happen with your money and property after you die. Is a living will a legal document? A living will is a legal document. Each state has its own laws about living blackman. If you move to another state, make sure that your living will is legal in the state where you now live. Or you might use a universal form that has been approved by many states. This kind of form can sometimes be completed and stored online. Your electronic copy will then be available wherever you have a connection to the Internet. In most cases, doctors will respect your wishes even if you have a form from a different state. · You don't need an  to complete a living will.  But legal advice can be helpful if your state's laws are unclear, your health history is complicated, or your family can't agree on what should be in your living will. · You can change your living will at any time. Some people find that their wishes about end-of-life care change as their health changes. · In addition to making a living will, think about completing a medical power of  form. This form lets you name the person you want to make end-of-life treatment decisions for you (your \"health care agent\") if you're not able to. Many hospitals and nursing homes will give you the forms you need to complete a living will and a medical power of . · Your living will is used only if you can't make or communicate decisions for yourself anymore. If you become able to make decisions again, you can accept or refuse any treatment, no matter what you wrote in your living will. · Your state may offer an online registry. This is a place where you can store your living will online so the doctors and nurses who need to treat you can find it right away. What should you think about when creating a living will? Talk about your end-of-life wishes with your family members and your doctor. Let them know what you want. That way the people making decisions for you won't be surprised by your choices. Think about these questions as you make your living will: · Do you know enough about life support methods that might be used? If not, talk to your doctor so you know what might be done if you can't breathe on your own, your heart stops, or you're unable to swallow. · What things would you still want to be able to do after you receive life-support methods? Would you want to be able to walk? To speak? To eat on your own? To live without the help of machines? · If you have a choice, where do you want to be cared for? In your home? At a hospital or nursing home? · Do you want certain Lutheran practices performed if you become very ill? · If you have a choice at the end of your life, where would you prefer to die? At home? In a hospital or nursing home? Somewhere else? · Would you prefer to be buried or cremated? · Do you want your organs to be donated after you die? What should you do with your living will? · Make sure that your family members and your health care agent have copies of your living will. · Give your doctor a copy of your living will to keep in your medical record. If you have more than one doctor, make sure that each one has a copy. · You may want to put a copy of your living will where it can be easily found. Where can you learn more? Go to http://trent-karen.info/. Enter F141 in the search box to learn more about \"Learning About Living Uzma Noriega. \" Current as of: August 8, 2016 Content Version: 11.3 © 1800-6142 [employer]. Care instructions adapted under license by [employer] [place] (which disclaims liability or warranty for this information). If you have questions about a medical condition or this instruction, always ask your healthcare professional. [place] [address] any warranty or liability for your use of this information. Advance Directives: Care Instructions Your Care Instructions An advance directive is a legal way to state your wishes at the end of your life. It tells your family and your doctor what to do if you can no longer say what you want. There are two main types of advance directives. You can change them any time that your wishes change. · A living will tells your family and your doctor your wishes about life support and other treatment. · A durable power of  for health care lets you name a person to make treatment decisions for you when you can't speak for yourself. This person is called a health care agent.  
If you do not have an advance directive, decisions about your medical care may be made by a doctor or a  who doesn't know you. It may help to think of an advance directive as a gift to the people who care for you. If you have one, they won't have to make tough decisions by themselves. Follow-up care is a key part of your treatment and safety. Be sure to make and go to all appointments, and call your doctor if you are having problems. It's also a good idea to know your test results and keep a list of the medicines you take. How can you care for yourself at home? · Discuss your wishes with your loved ones and your doctor. This way, there are no surprises. · Many states have a unique form. Or you might use a universal form that has been approved by many states. This kind of form can sometimes be completed and stored online. Your electronic copy will then be available wherever you have a connection to the Internet. In most cases, doctors will respect your wishes even if you have a form from a different state. · You don't need a  to do an advance directive. But you may want to get legal advice. · Think about these questions when you prepare an advance directive: ¨ Who do you want to make decisions about your medical care if you are not able to? Many people choose a family member or close friend. ¨ Do you know enough about life support methods that might be used? If not, talk to your doctor so you understand. ¨ What are you most afraid of that might happen? You might be afraid of having pain, losing your independence, or being kept alive by machines. ¨ Where would you prefer to die? Choices include your home, a hospital, or a nursing home. ¨ Would you like to have information about hospice care to support you and your family? ¨ Do you want to donate organs when you die? ¨ Do you want certain Anglican practices performed before you die? If so, put your wishes in the advance directive. · Read your advance directive every year, and make changes as needed. When should you call for help? Be sure to contact your doctor if you have any questions. Where can you learn more? Go to http://trent-karen.info/. Enter R264 in the search box to learn more about \"Advance Directives: Care Instructions. \" Current as of: November 17, 2016 Content Version: 11.3 © 2817-6014 [employer]. Care instructions adapted under license by [employer] (which disclaims liability or warranty for this information). If you have questions about a medical condition or this instruction, always ask your healthcare professional. [address] any warranty or liability for your use of this information. Week 1. Continue Aptiom 800 mg daily. Start Trokendi XR 25 mg daily Week 2 . Continue Aptiom 800 mg daily. Start Trokendi XR 50 mg daily Week 3. Continue Aptiom 800 mg daily. Start Troekdni  mg daily Week 4. Keep 800 mg aptiom daily. Start Trokendi  mg daily Week 5. Drop to 400 mg aptiom daily. Continue Trokendi 200 mg daily Week 6. Stop Aptiom completely . Continue Trokendi  mg daily Introducing [facility]! [facility] introduces Leostream patient portal. Now you can access parts of your medical record, email your doctor's office, and request medication refills online. 1. In your internet browser, go to [email or web address] 2. Click on the First Time User? Click Here link in the Sign In box. You will see the New Member Sign Up page. 3. Enter your Leostream Access Code exactly as it appears below. You will not need to use this code after youve completed the sign-up process. If you do not sign up before the expiration date, you must request a new code. · Leostream Access Code: [email or web address] Expires: 11/28/2017  3:56 PM 
 
4. Enter the last four digits of your Social Security Number (xxxx) and Date of Birth (mm/dd/yyyy) as indicated and click Submit.  You will be taken to the next sign-up page. 5. Create a Visys ID. This will be your Visys login ID and cannot be changed, so think of one that is secure and easy to remember. 6. Create a Visys password. You can change your password at any time. 7. Enter your Password Reset Question and Answer. This can be used at a later time if you forget your password. 8. Enter your e-mail address. You will receive e-mail notification when new information is available in [address]. 9. Click Sign Up. You can now view and download portions of your medical record. 10. Click the Download Summary menu link to download a portable copy of your medical information. If you have questions, please visit the Frequently Asked Questions section of the Visys website. Remember, Visys is NOT to be used for urgent needs. For medical emergencies, dial 911. Now available from your iPhone and Android! Please provide this summary of care documentation to your next provider. Your primary care clinician is listed as Robert Haywood. If you have any questions after today's visit, please call [phone].

## 2017-10-10 ENCOUNTER — APPOINTMENT (OUTPATIENT)
Dept: GENERAL RADIOLOGY | Age: 57
End: 2017-10-10
Attending: EMERGENCY MEDICINE
Payer: COMMERCIAL

## 2017-10-10 ENCOUNTER — HOSPITAL ENCOUNTER (EMERGENCY)
Age: 57
Discharge: HOME | End: 2017-10-10
Attending: EMERGENCY MEDICINE
Payer: COMMERCIAL

## 2017-10-10 VITALS
SYSTOLIC BLOOD PRESSURE: 152 MMHG | OXYGEN SATURATION: 97 % | TEMPERATURE: 97.7 F | BODY MASS INDEX: 33.24 KG/M2 | WEIGHT: 259 LBS | DIASTOLIC BLOOD PRESSURE: 86 MMHG | RESPIRATION RATE: 14 BRPM | HEIGHT: 74 IN | HEART RATE: 64 BPM

## 2017-10-10 DIAGNOSIS — R07.9 CHEST PAIN, UNSPECIFIED TYPE: ICD-10-CM

## 2017-10-10 DIAGNOSIS — R05.9 COUGH: Primary | ICD-10-CM

## 2017-10-10 LAB
ALBUMIN SERPL-MCNC: 4.1 G/DL (ref 3.5–5)
ALBUMIN/GLOB SERPL: 1.2 {RATIO} (ref 1.1–2.2)
ALP SERPL-CCNC: 66 U/L (ref 45–117)
ALT SERPL-CCNC: 57 U/L (ref 12–78)
ANION GAP SERPL CALC-SCNC: 10 MMOL/L (ref 5–15)
AST SERPL-CCNC: 24 U/L (ref 15–37)
ATRIAL RATE: 74 BPM
BASOPHILS # BLD: 0 K/UL (ref 0–0.1)
BASOPHILS NFR BLD: 1 % (ref 0–1)
BILIRUB SERPL-MCNC: 0.4 MG/DL (ref 0.2–1)
BNP SERPL-MCNC: 38 PG/ML (ref 0–125)
BUN SERPL-MCNC: 15 MG/DL (ref 6–20)
BUN/CREAT SERPL: 17 (ref 12–20)
CALCIUM SERPL-MCNC: 8.6 MG/DL (ref 8.5–10.1)
CALCULATED P AXIS, ECG09: 43 DEGREES
CALCULATED R AXIS, ECG10: 67 DEGREES
CALCULATED T AXIS, ECG11: 93 DEGREES
CHLORIDE SERPL-SCNC: 107 MMOL/L (ref 97–108)
CO2 SERPL-SCNC: 23 MMOL/L (ref 21–32)
CREAT SERPL-MCNC: 0.9 MG/DL (ref 0.7–1.3)
DIAGNOSIS, 93000: NORMAL
EOSINOPHIL # BLD: 0.2 K/UL (ref 0–0.4)
EOSINOPHIL NFR BLD: 2 % (ref 0–7)
ERYTHROCYTE [DISTWIDTH] IN BLOOD BY AUTOMATED COUNT: 13.3 % (ref 11.5–14.5)
GLOBULIN SER CALC-MCNC: 3.4 G/DL (ref 2–4)
GLUCOSE SERPL-MCNC: 136 MG/DL (ref 65–100)
HCT VFR BLD AUTO: 42.7 % (ref 36.6–50.3)
HGB BLD-MCNC: 14.4 G/DL (ref 12.1–17)
LIPASE SERPL-CCNC: 176 U/L (ref 73–393)
LYMPHOCYTES # BLD: 2 K/UL (ref 0.8–3.5)
LYMPHOCYTES NFR BLD: 27 % (ref 12–49)
MCH RBC QN AUTO: 29.9 PG (ref 26–34)
MCHC RBC AUTO-ENTMCNC: 33.7 G/DL (ref 30–36.5)
MCV RBC AUTO: 88.6 FL (ref 80–99)
MONOCYTES # BLD: 0.6 K/UL (ref 0–1)
MONOCYTES NFR BLD: 8 % (ref 5–13)
NEUTS SEG # BLD: 4.8 K/UL (ref 1.8–8)
NEUTS SEG NFR BLD: 62 % (ref 32–75)
P-R INTERVAL, ECG05: 160 MS
PLATELET # BLD AUTO: 287 K/UL (ref 150–400)
POTASSIUM SERPL-SCNC: 3.8 MMOL/L (ref 3.5–5.1)
PROT SERPL-MCNC: 7.5 G/DL (ref 6.4–8.2)
Q-T INTERVAL, ECG07: 400 MS
QRS DURATION, ECG06: 94 MS
QTC CALCULATION (BEZET), ECG08: 444 MS
RBC # BLD AUTO: 4.82 M/UL (ref 4.1–5.7)
SODIUM SERPL-SCNC: 140 MMOL/L (ref 136–145)
TROPONIN I SERPL-MCNC: <0.04 NG/ML
VENTRICULAR RATE, ECG03: 74 BPM
WBC # BLD AUTO: 7.6 K/UL (ref 4.1–11.1)

## 2017-10-10 PROCEDURE — 99284 EMERGENCY DEPT VISIT MOD MDM: CPT

## 2017-10-10 PROCEDURE — 36415 COLL VENOUS BLD VENIPUNCTURE: CPT | Performed by: EMERGENCY MEDICINE

## 2017-10-10 PROCEDURE — 80053 COMPREHEN METABOLIC PANEL: CPT | Performed by: EMERGENCY MEDICINE

## 2017-10-10 PROCEDURE — 71020 XR CHEST PA LAT: CPT

## 2017-10-10 PROCEDURE — 94640 AIRWAY INHALATION TREATMENT: CPT

## 2017-10-10 PROCEDURE — 77030013140 HC MSK NEB VYRM -A

## 2017-10-10 PROCEDURE — 85025 COMPLETE CBC W/AUTO DIFF WBC: CPT | Performed by: EMERGENCY MEDICINE

## 2017-10-10 PROCEDURE — 93005 ELECTROCARDIOGRAM TRACING: CPT

## 2017-10-10 PROCEDURE — 83880 ASSAY OF NATRIURETIC PEPTIDE: CPT | Performed by: EMERGENCY MEDICINE

## 2017-10-10 PROCEDURE — 74011000250 HC RX REV CODE- 250

## 2017-10-10 PROCEDURE — 83690 ASSAY OF LIPASE: CPT | Performed by: EMERGENCY MEDICINE

## 2017-10-10 PROCEDURE — 84484 ASSAY OF TROPONIN QUANT: CPT | Performed by: EMERGENCY MEDICINE

## 2017-10-10 RX ORDER — IPRATROPIUM BROMIDE AND ALBUTEROL SULFATE 2.5; .5 MG/3ML; MG/3ML
3 SOLUTION RESPIRATORY (INHALATION)
Status: COMPLETED | OUTPATIENT
Start: 2017-10-10 | End: 2017-10-10

## 2017-10-10 RX ORDER — AMOXICILLIN AND CLAVULANATE POTASSIUM 875; 125 MG/1; MG/1
1 TABLET, FILM COATED ORAL EVERY 12 HOURS
COMMUNITY
End: 2017-10-24 | Stop reason: ALTCHOICE

## 2017-10-10 RX ORDER — IPRATROPIUM BROMIDE AND ALBUTEROL SULFATE 2.5; .5 MG/3ML; MG/3ML
SOLUTION RESPIRATORY (INHALATION)
Status: COMPLETED
Start: 2017-10-10 | End: 2017-10-10

## 2017-10-10 RX ADMIN — IPRATROPIUM BROMIDE AND ALBUTEROL SULFATE 3 ML: 2.5; .5 SOLUTION RESPIRATORY (INHALATION) at 07:01

## 2017-10-10 RX ADMIN — IPRATROPIUM BROMIDE AND ALBUTEROL SULFATE 3 ML: .5; 3 SOLUTION RESPIRATORY (INHALATION) at 07:01

## 2017-10-10 NOTE — DISCHARGE INSTRUCTIONS
Chest Pain: Care Instructions  Your Care Instructions  There are many things that can cause chest pain. Some are not serious and will get better on their own in a few days. But some kinds of chest pain need more testing and treatment. Your doctor may have recommended a follow-up visit in the next 8 to 12 hours. If you are not getting better, you may need more tests or treatment. Even though your doctor has released you, you still need to watch for any problems. The doctor carefully checked you, but sometimes problems can develop later. If you have new symptoms or if your symptoms do not get better, get medical care right away. If you have worse or different chest pain or pressure that lasts more than 5 minutes or you passed out (lost consciousness), call 911 or seek other emergency help right away. A medical visit is only one step in your treatment. Even if you feel better, you still need to do what your doctor recommends, such as going to all suggested follow-up appointments and taking medicines exactly as directed. This will help you recover and help prevent future problems. How can you care for yourself at home? · Rest until you feel better. · Take your medicine exactly as prescribed. Call your doctor if you think you are having a problem with your medicine. · Do not drive after taking a prescription pain medicine. When should you call for help? Call 911 if:  · You passed out (lost consciousness). · You have severe difficulty breathing. · You have symptoms of a heart attack. These may include:  ¨ Chest pain or pressure, or a strange feeling in your chest.  ¨ Sweating. ¨ Shortness of breath. ¨ Nausea or vomiting. ¨ Pain, pressure, or a strange feeling in your back, neck, jaw, or upper belly or in one or both shoulders or arms. ¨ Lightheadedness or sudden weakness. ¨ A fast or irregular heartbeat.   After you call 911, the  may tell you to chew 1 adult-strength or 2 to 4 low-dose aspirin. Wait for an ambulance. Do not try to drive yourself. Call your doctor today if:  · You have any trouble breathing. · Your chest pain gets worse. · You are dizzy or lightheaded, or you feel like you may faint. · You are not getting better as expected. · You are having new or different chest pain. Where can you learn more? Go to http://trent-karen.info/. Enter A120 in the search box to learn more about \"Chest Pain: Care Instructions. \"  Current as of: March 20, 2017  Content Version: 11.3  © 8836-1738 [employer]. Care instructions adapted under license by [employer] (which disclaims liability or warranty for this information). If you have questions about a medical condition or this instruction, always ask your healthcare professional. [address] any warranty or liability for your use of this information. We hope that we have addressed all of your medical concerns. The examination and treatment you received in the Emergency Department were for an emergent problem and were not intended as complete care. It is important that you follow up with your healthcare provider(s) for ongoing care. If your symptoms worsen or do not improve as expected, and you are unable to reach your usual health care provider(s), you should return to the Emergency Department. Today's healthcare is undergoing tremendous change, and patient satisfaction surveys are one of the many tools to assess the quality of medical care. You may receive a survey from the [employer] organization regarding your experience in the Emergency Department. I hope that your experience has been completely positive, particularly the medical care that I provided. As such, please participate in the survey; anything less than excellent does not meet my expectations or intentions.         [facility] and 8 [facility] participate in nationally recognized quality of care measures. If your blood pressure is greater than 120/80, as reported below, we urge that you seek medical care to address the potential of high blood pressure, commonly known as hypertension. Hypertension can be hereditary or can be caused by certain medical conditions, pain, stress, or \"white coat syndrome. \"       Please make an appointment with your health care provider(s) for follow up of your Emergency Department visit. VITALS:   Patient Vitals for the past 8 hrs:   Temp Pulse Resp BP SpO2   10/10/17 0822 - - 18 136/77 98 %   10/10/17 0722 - 69 18 - 98 %   10/10/17 0705 97.7 °F (36.5 °C) 66 24 162/86 93 %          Thank you for allowing us to provide you with medical care today. We realize that you have many choices for your emergency care needs. Please choose us in the future for any continued health care needs. Ivette [address], [address] [facility], [place].   Office: [phone]            Recent Results (from the past 24 hour(s))   CBC WITH AUTOMATED DIFF    Collection Time: 10/10/17  7:19 AM   Result Value Ref Range    WBC 7.6 4.1 - 11.1 K/uL    RBC 4.82 4.10 - 5.70 M/uL    HGB 14.4 12.1 - 17.0 g/dL    HCT 42.7 36.6 - 50.3 %    MCV 88.6 80.0 - 99.0 FL    MCH 29.9 26.0 - 34.0 PG    MCHC 33.7 30.0 - 36.5 g/dL    RDW 13.3 11.5 - 14.5 %    PLATELET 680 987 - 898 K/uL    NEUTROPHILS 62 32 - 75 %    LYMPHOCYTES 27 12 - 49 %    MONOCYTES 8 5 - 13 %    EOSINOPHILS 2 0 - 7 %    BASOPHILS 1 0 - 1 %    ABS. NEUTROPHILS 4.8 1.8 - 8.0 K/UL    ABS. LYMPHOCYTES 2.0 0.8 - 3.5 K/UL    ABS. MONOCYTES 0.6 0.0 - 1.0 K/UL    ABS. EOSINOPHILS 0.2 0.0 - 0.4 K/UL    ABS.  BASOPHILS 0.0 0.0 - 0.1 K/UL   METABOLIC PANEL, COMPREHENSIVE    Collection Time: 10/10/17  7:19 AM   Result Value Ref Range    Sodium 140 136 - 145 mmol/L    Potassium 3.8 3.5 - 5.1 mmol/L    Chloride 107 97 - 108 mmol/L    CO2 23 21 - 32 mmol/L    Anion gap 10 5 - 15 mmol/L Glucose 136 (H) 65 - 100 mg/dL    BUN 15 6 - 20 MG/DL    Creatinine 0.90 0.70 - 1.30 MG/DL    BUN/Creatinine ratio 17 12 - 20      GFR est AA >60 >60 ml/min/1.73m2    GFR est non-AA >60 >60 ml/min/1.73m2    Calcium 8.6 8.5 - 10.1 MG/DL    Bilirubin, total 0.4 0.2 - 1.0 MG/DL    ALT (SGPT) 57 12 - 78 U/L    AST (SGOT) 24 15 - 37 U/L    Alk. phosphatase 66 45 - 117 U/L    Protein, total 7.5 6.4 - 8.2 g/dL    Albumin 4.1 3.5 - 5.0 g/dL    Globulin 3.4 2.0 - 4.0 g/dL    A-G Ratio 1.2 1.1 - 2.2     TROPONIN I    Collection Time: 10/10/17  7:19 AM   Result Value Ref Range    Troponin-I, Qt. <0.04 <0.05 ng/mL   LIPASE    Collection Time: 10/10/17  7:19 AM   Result Value Ref Range    Lipase 176 73 - 393 U/L   NT-PRO BNP    Collection Time: 10/10/17  7:19 AM   Result Value Ref Range    NT pro-BNP 38 0 - 125 PG/ML       Xr Chest Pa Lat    Result Date: 10/10/2017  Exam:  2 view chest Indication: Cough, chest tightness PA and lateral views demonstrate normal heart size. There is no acute process in the lung fields. Degenerative changes are seen in the thoracic spine. Impression: No acute process. Cough: Care Instructions  Your Care Instructions  A cough is your body's response to something that bothers your throat or airways. Many things can cause a cough. You might cough because of a cold or the flu, bronchitis, or asthma. Smoking, postnasal drip, allergies, and stomach acid that backs up into your throat also can cause coughs. A cough is a symptom, not a disease. Most coughs stop when the cause, such as a cold, goes away. You can take a few steps at home to cough less and feel better. Follow-up care is a key part of your treatment and safety. Be sure to make and go to all appointments, and call your doctor if you are having problems. It's also a good idea to know your test results and keep a list of the medicines you take. How can you care for yourself at home? · Drink lots of water and other fluids.  This helps thin the mucus and soothes a dry or sore throat. Honey or lemon juice in hot water or tea may ease a dry cough. · Take cough medicine as directed by your doctor. · Prop up your head on pillows to help you breathe and ease a dry cough. · Try cough drops to soothe a dry or sore throat. Cough drops don't stop a cough. Medicine-flavored cough drops are no better than candy-flavored drops or hard candy. · Do not smoke. Avoid secondhand smoke. If you need help quitting, talk to your doctor about stop-smoking programs and medicines. These can increase your chances of quitting for good. When should you call for help? Call 911 anytime you think you may need emergency care. For example, call if:  · You have severe trouble breathing. Call your doctor now or seek immediate medical care if:  · You cough up blood. · You have new or worse trouble breathing. · You have a new or higher fever. · You have a new rash. Watch closely for changes in your health, and be sure to contact your doctor if:  · You cough more deeply or more often, especially if you notice more mucus or a change in the color of your mucus. · You have new symptoms, such as a sore throat, an earache, or sinus pain. · You do not get better as expected. Where can you learn more? Go to http://trent-karen.info/. Enter D279 in the search box to learn more about \"Cough: Care Instructions. \"  Current as of: March 25, 2017  Content Version: 11.3  © 6252-0013 [employer]. Care instructions adapted under license by [employer] (which disclaims liability or warranty for this information). If you have questions about a medical condition or this instruction, always ask your healthcare professional. [place] [address] any warranty or liability for your use of this information.

## 2017-10-10 NOTE — ED TRIAGE NOTES
I am having a hard time breathing. Diagnosed with Bronchitis 8 days ago.  Head congestion has improved but continues having pain in chest and a hard time breathing

## 2017-10-10 NOTE — ED PROVIDER NOTES
HPI Comments: 64 y.o. male with past medical history significant for migraines, seizures, hypertension, and stroke who presents from home with chief complaint of shortness of breath. Patient notes he was recently dx with bronchitis approximately 1 week ago with Dr. Kriss Her, his PCP, and he has been taking Amoxicillin with little relief. Patient states he has not completely finished his course of antibiotics yet. Patient comes in today with worsening shortness of breath and difficulty taking a deep breath. Patient reports accompanying chest \"fullness\" described as a \"belt around his chest\" over the past 2 weeks. Patient also complains of a persistent cough with some mucous and black specks. Patient denies any hx of asthma or COPD. Patient denies any blood in cough, wheezing, nausea, vomiting, fever, and chills. There are no other acute medical concerns at this time. Old Chart Review: No pertinent medical records on file. Social hx: Tobacco Use: No (former smoker), Alcohol Use: Rarely, Drug Use: No    PCP: Ricky Hodges MD    Note written by Charleen Melissa, as dictated by Iona Montanez MD 7:14 AM      The history is provided by the patient. Past Medical History:   Diagnosis Date    Hypertension     Migraines     Seizures ([facility] [address]     Stroke [facility])        Past Surgical History:   Procedure Laterality Date    HX ACL RECONSTRUCTION      right knee 6 times         Family History:   Problem Relation Age of Onset    Cancer Father      prostate    Stroke Maternal Aunt     Diabetes Sister        Social History     Social History    Marital status:      Spouse name: N/A    Number of children: N/A    Years of education: N/A     Occupational History    Not on file.      Social History Main Topics    Smoking status: Former Smoker    Smokeless tobacco: Never Used    Alcohol use Yes      Comment: rarely    Drug use: No    Sexual activity: Not on file     Other Topics Concern    Not on file     Social History Narrative         ALLERGIES: Aptiom [eslicarbazepine] and Levetiracetam    Review of Systems   Constitutional: Negative for chills and fever. Respiratory: Positive for cough, chest tightness and shortness of breath. Negative for wheezing. Gastrointestinal: Negative for nausea and vomiting. All other systems reviewed and are negative. Vitals:    10/10/17 0705 10/10/17 0722   BP: 162/86    Pulse: 66 69   Resp: 24 18   Temp: 97.7 °F (36.5 °C)    SpO2: 93% 98%   Weight: 117.5 kg (259 lb)    Height: 6' 2\" (1.88 m)             Physical Exam   Constitutional: He is oriented to person, place, and time. He appears well-developed and well-nourished. No distress. NAD, AxOx4, speaking in complete sentences, getting a NEB     HENT:   Head: Normocephalic and atraumatic. Nose: Nose normal.   Mouth/Throat: Oropharynx is clear and moist.   Eyes: Conjunctivae and EOM are normal. Pupils are equal, round, and reactive to light. Right eye exhibits no discharge. Left eye exhibits no discharge. Neck: Normal range of motion. Neck supple. Cardiovascular: Normal rate, regular rhythm, normal heart sounds and intact distal pulses. Exam reveals no gallop and no friction rub. No murmur heard. Pulmonary/Chest: Effort normal and breath sounds normal. No respiratory distress. He has no wheezes. He has no rales. He exhibits no tenderness. Abdominal: Soft. Bowel sounds are normal. He exhibits no distension and no mass. There is no tenderness. There is no rebound and no guarding. nttp     Genitourinary:   Genitourinary Comments: Pt denies urinary/ Testicular/ scrotal or penile  complaints   Musculoskeletal: Normal range of motion. He exhibits no edema. Lymphadenopathy:     He has no cervical adenopathy. Neurological: He is alert and oriented to person, place, and time. He has normal reflexes. No cranial nerve deficit.  Coordination normal.   pt has motor/ CV/ Sensation grossly intact to all extremities, R = L in strength;   Skin: Skin is warm and dry. No rash noted. No erythema. Psychiatric: He has a normal mood and affect. His behavior is normal.   Nursing note and vitals reviewed. [facility]  ED Course       Procedures    No chief complaint on file.      7:22 AM  The patients presenting problems have been discussed, and they are in agreement with the care plan formulated and outlined with them. I have encouraged them to ask questions as they arise throughout their visit. MEDICATIONS GIVEN:  Medications   albuterol-ipratropium (DUO-NEB) 2.5 MG-0.5 MG/3 ML (3 mL Nebulization Given 10/10/17 0701)       LABS REVIEWED:  Labs Reviewed   SAMPLES BEING HELD   CBC WITH AUTOMATED DIFF   METABOLIC PANEL, COMPREHENSIVE   TROPONIN I   LIPASE   NT-PRO BNP       RADIOLOGY RESULTS:  The following have been ordered and reviewed:  _____________________________________________________________________  _____________________________________________________________________    EKG interpretation: (Preliminary)  Rhythm: normal sinus rhythm; and regular . Rate (approx.): 74; Axis: normal; P wave: normal; QRS interval: normal ; ST/T wave: normal; Negative acute significant segmental elevations/ no old study    PROCEDURES:        CONSULTATIONS:       PROGRESS NOTES:      DIAGNOSIS:    1. Cough    2. Chest pain, unspecified type        PLAN:  1- cough/ Chest Pain / neg ed evaluation - will have pt follow-up with Cardiology;       ED COURSE: The patients hospital course has been uncomplicated. 8:34 AM  'feeling better now'; awaiting results;     8:52 AM  Matt Gomez's  results have been reviewed with him. He has been counseled regarding his diagnosis. He verbally conveys understanding and agreement of the signs, symptoms, diagnosis, treatment and prognosis and additionally agrees to Call/ Arrange follow up as recommended with Dr. Fabiana Zenaida, MD in 24 - 48 hours.   He also agrees with the care-plan and conveys that all of his questions have been answered. I have also put together some discharge instructions for him that include: 1) educational information regarding their diagnosis, 2) how to care for their diagnosis at home, as well a 3) list of reasons why they would want to return to the ED prior to their follow-up appointment, should their condition change or for concerns.

## 2017-10-10 NOTE — Clinical Note
Thank you for allowing us to provide you with medical care today. We realize that you have many choices for your emergency care needs. We thank you for choosing [facility]. Please choose us in the future for any continued health care needs. We hope we addressed all of your medical concerns. We strive to provide excellent quality care in the Emergency Department. Anything less than excellent does not meet our expectations. The exam and treatment you received in the Emergency Department  were for an emergent problem and are not intended as complete care. It is important that you follow up with a doctor, nurse practitioner, or [address] [place] [address] assistant for ongoing care. If your symptoms worsen or you do not improve as expected and you are un able to reach your usual health care provider, you should return to the Emergency Department. We are available 24 hours a day. Take this sheet with you when you go to your follow-up visit. If you have any problem arranging the follow-up visit, co ntact the Emergency Department immediately. Make an appointment your family doctor for follow up of this visit. Return to the ER if you are unable to be seen in a timely manner.

## 2017-10-24 ENCOUNTER — OFFICE VISIT (OUTPATIENT)
Dept: CARDIOLOGY CLINIC | Age: 57
End: 2017-10-24

## 2017-10-24 VITALS
SYSTOLIC BLOOD PRESSURE: 168 MMHG | RESPIRATION RATE: 18 BRPM | WEIGHT: 264.4 LBS | BODY MASS INDEX: 33.95 KG/M2 | DIASTOLIC BLOOD PRESSURE: 98 MMHG | OXYGEN SATURATION: 98 % | HEART RATE: 67 BPM

## 2017-10-24 DIAGNOSIS — R07.9 CHEST PAIN, UNSPECIFIED TYPE: ICD-10-CM

## 2017-10-24 DIAGNOSIS — I10 ESSENTIAL HYPERTENSION: Primary | ICD-10-CM

## 2017-10-24 DIAGNOSIS — R06.09 DOE (DYSPNEA ON EXERTION): ICD-10-CM

## 2017-10-24 NOTE — PROGRESS NOTES
[address]  [address]  [place], [address] [facility]    Office [phone]  Fax [phone]  Cell [phone]        Que Snyder is a 64 y.o. male referred for evaluation of SOB. Consult requested by Coral Rodriguez MD.      Assessment  Encounter Diagnoses   Name Primary?  Essential hypertension Yes    VILLAFUERTE (dyspnea on exertion)     Chest pain, unspecified type        Recommendations:    Que Snyder had recent bronchitis associated with dyspnea and chest pain. ED evaluation demonstrated normal CXR, cardiac biomarkers and EKG. He feels much better at this time. While there was no cardiac component to his symptom complex he is at intermediate CAD risk by virtue of his age and HTN. Will risk stratify with stress echo. We discussed screening CT heart scan if this is normal.     BP high today but normotensive at home. Phone follow up after reviewing tests    Subjective:    Patient has a hx of HTN but no previous cardiac hx. He developed SOB with cough 3 weeks ago ultimately resulting in ED visit on 10/10/17 with diffuse anterior chest pain. ED evaluation was negative from cardiac standpoint with negative troponin, EKG,  CXR, and BNP. Sxs were attributed to bronchitis. He was rx'd a nonsteroid inhaler. Patient has been breathing well and taking walks with no exertional sxs. He has had no recurrence of chest pain. BPs at home are typically 135/80. He quit smoking in . He notes his mother  at 78 during surgery. His brothers and sisters are all alive and healthy. Patient denies any exertional chest pain, dyspnea, palpitations, syncope, orthopnea, edema or paroxysmal nocturnal dyspnea. Patient is a gonzalez specializing in interior trim    Cardiac risk factors   HTN yes  DM no  Smoking yes  Family hx of CAD- no    Cardiac testing  No specialty comments available.     Past Medical History:   Diagnosis Date    Hypertension     Migraines  Seizures (HCC)     Stroke [facility])         Current Outpatient Prescriptions   Medication Sig Dispense Refill    topiramate ER (TROKENDI XR) 200 mg capsule Take 1 capsule by mouth nightly 30 Cap 5    ZOLMitriptan (ZOMIG) 5 mg nasal solution 1 spray in nostril at HA onset. May repeat again in 2 hours X 1 dose. No mor then 2 doses in 24 hours 9 Container 2    olmesartan (BENICAR) 20 mg tablet Take 20 mg by mouth daily.  eslicarbazepine (APTIOM) 800 mg tab Take  by mouth.  aspirin delayed-release 81 mg tablet Take  by mouth daily. Allergies   Allergen Reactions    Aptiom [Eslicarbazepine] Anxiety and Other (comments)     Pt reports anger and sores as a reaction to aptiom.  Levetiracetam Other (comments)     Generic form causes severe Headache      [place]    Review of Systems  Constitutional: Negative for fever, chills, malaise/fatigue and diaphoresis. Respiratory: Negative for hemoptysis, sputum production, and wheezing. +SOB, resolved  Cardiovascular: Negative for palpitations, orthopnea, claudication, leg swelling and PND. +diffuse anterior chest pain, resolved  Gastrointestinal: Negative for heartburn, nausea, vomiting, blood in stool and melena. Genitourinary: Negative for dysuria and flank pain. Musculoskeletal: Negative for joint pain and back pain. Skin: Negative for rash. Neurological: Negative for focal weakness, seizures, loss of consciousness, weakness and headaches. Endo/Heme/Allergies: Does not bruise/bleed easily. Psychiatric/Behavioral: Negative for memory loss. The patient does not have insomnia.       Physical Exam    Visit Vitals    BP (!) 168/98    Pulse 67    Resp 18    Wt 264 lb 6.4 oz (119.9 kg)    SpO2 98%    BMI 33.95 kg/m2     Wt Readings from Last 3 Encounters:   10/24/17 264 lb 6.4 oz (119.9 kg)   10/10/17 259 lb (117.5 kg)   08/30/17 272 lb (123.4 kg)      General - well developed well nourished  Neck - JVP normal, thyroid nl  Cardiac - normal S1, S2, no murmurs, rubs or gallops.  No clicks  Vascular - carotids without bruits, radials, femorals and pedal pulses equal bilateral  Lungs - clear to auscultation bilaterals, no rales, wheezing or rhonchi  Abd - soft nontender, no HSM, no abd bruits  Extremities - no edema  Skin - no rash  Neuro - nonfocal  Psych - normal mood and affect      Cardiographics  EKG 10/10/17- SR, normal    Written by Jean Paul Mckenzie, as dictated by Brii Culver MD.  Brii Culver MD

## 2017-10-30 ENCOUNTER — OFFICE VISIT (OUTPATIENT)
Dept: NEUROLOGY | Age: 57
End: 2017-10-30

## 2017-10-30 VITALS
WEIGHT: 266 LBS | SYSTOLIC BLOOD PRESSURE: 130 MMHG | HEIGHT: 74 IN | DIASTOLIC BLOOD PRESSURE: 66 MMHG | BODY MASS INDEX: 34.14 KG/M2

## 2017-10-30 DIAGNOSIS — G40.209 PARTIAL EPILEPSY WITH IMPAIRMENT OF CONSCIOUSNESS (HCC): Primary | ICD-10-CM

## 2017-10-30 NOTE — MR AVS SNAPSHOT
Visit Information Date & Time Provider Department Dept. Phone Encounter #  
 10/30/2017  3:30 PM MARISSA Lauren [facility] [phone] [id number] Follow-up Instructions Return in about 3 months (around 1/30/2018). Your Appointments 11/9/2017  2:00 PM  
ECHO CARDIOGRAMS 2D with ECHO, STFRANCIS  
[facility] (JORGE SCHEDULING) Appt Note: stress echo per dr Ann-Marie Hannah [address] [place] [address] [facility] [address]  
[phone]  
  
   
 [address] [facility] [address]  
  
    
 11/9/2017  2:00 PM  
STRESS ECHOCARDIOGRAMS with LEONARDO DE LEON  
[facility] ([address]) Appt Note: stress echo per dr Ann-Marie Hannah [address]  
[address] [place] [address] [place] [address] [address] Upcoming Health Maintenance Date Due Hepatitis C Screening 1960 DTaP/Tdap/Td series (1 - Tdap) 12/12/1981 FOBT Q 1 YEAR AGE 50-75 12/12/2010 INFLUENZA AGE 9 TO ADULT 8/1/2017 Allergies as of 10/30/2017  Review Complete On: 10/30/2017 By: Stefani Schafer Severity Noted Reaction Type Reactions Aptiom [Eslicarbazepine]  04/23/5865    Anxiety, Other (comments) Pt reports anger and sores as a reaction to aptiom. Levetiracetam  08/16/2012    Other (comments) Generic form causes severe Headache Current Immunizations  Never Reviewed No immunizations on file. Not reviewed this visit You Were Diagnosed With   
  
 Codes Comments Partial epilepsy with impairment of consciousness ([facility] [address]    -  Primary ICD-10-CM: Q97.149 ICD-9-CM: 345.40 Vitals BP Height(growth percentile) Weight(growth percentile) BMI Smoking Status 130/66 6' 2\" (1.88 m) 266 lb (120.7 kg) 34.15 kg/m2 Former Smoker BMI and BSA Data Body Mass Index Body Surface Area  
 34.15 kg/m 2 2.51 m 2 Preferred Pharmacy Pharmacy Name Phone [id number] [phone]) [phone] Your Updated Medication List  
  
   
This list is accurate as of: 10/30/17  3:59 PM.  Always use your most recent med list.  
  
  
  
  
 BENICAR 20 mg tablet Generic drug:  olmesartan Take 20 mg by mouth daily. topiramate  mg capsule Commonly known as:  TROKENDI XR Take 1 capsule by mouth nightly ZOLMitriptan 5 mg nasal solution Commonly known as:  ZOMIG  
1 spray in nostril at HA onset. May repeat again in 2 hours X 1 dose. No mor then 2 doses in 24 hours Follow-up Instructions Return in about 3 months (around 1/30/2018). To-Do List   
 10/30/2017 Neurology:  EEG AMB NEURO Patient Instructions PRESCRIPTION REFILL POLICY [facility] Statement to Patients April 1, 2014 In an effort to ensure the large volume of patient prescription refills is processed in the most efficient and expeditious manner, we are asking our patients to assist us by calling your Pharmacy for all prescription refills, this will include also your  Mail Order Pharmacy. The pharmacy will contact our office electronically to continue the refill process. Please do not wait until the last minute to call your pharmacy. We need at least 48 hours (2days) to fill prescriptions. We also encourage you to call your pharmacy before going to  your prescription to make sure it is ready. With regard to controlled substance prescription refill requests (narcotic refills) that need to be picked up at our office, we ask your cooperation by providing us with at least 72 hours (3days) notice that you will need a refill.  
 
We will not refill narcotic prescription refill requests after 4:00pm on any weekday, Monday through Thursday, or after 2:00pm on Fridays, or on the weekends. We encourage everyone to explore another way of getting your prescription refill request processed using [employer], our patient web portal through our electronic medical record system. Prosodict is an efficient and effective way to communicate your medication request directly to the office and  downloadable as an jonathan on your smart phone . [employer] also features a review functionality that allows you to view your medication list as well as leave messages for your physician. Are you ready to get connected? If so please review the attatched instructions or speak to any of our staff to get you set up right away! Thank you so much for your cooperation. Should you have any questions please contact our Practice Administrator. The Physicians and Staff,  [facility] Edvin R[address]ussen [address] What is a living will? A living will is a legal form you use to write down the kind of care you want at the end of your life. It is used by the health professionals who will treat you if you aren't able to decide for yourself. If you put your wishes in writing, your loved ones and others will know what kind of care you want. They won't need to guess. This can ease your mind and be helpful to others. A living will is not the same as an estate or property will. An estate will explains what you want to happen with your money and property after you die. Is a living will a legal document? A living will is a legal document. Each state has its own laws about living blackman. If you move to another state, make sure that your living will is legal in the state where you now live. Or you might use a universal form that has been approved by many states. This kind of form can sometimes be completed and stored online. Your electronic copy will then be available wherever you have a connection to the Internet.  In most cases, doctors will respect your wishes even if you have a form from a different state. · You don't need an  to complete a living will. But legal advice can be helpful if your state's laws are unclear, your health history is complicated, or your family can't agree on what should be in your living will. · You can change your living will at any time. Some people find that their wishes about end-of-life care change as their health changes. · In addition to making a living will, think about completing a medical power of  form. This form lets you name the person you want to make end-of-life treatment decisions for you (your \"health care agent\") if you're not able to. Many hospitals and nursing homes will give you the forms you need to complete a living will and a medical power of . · Your living will is used only if you can't make or communicate decisions for yourself anymore. If you become able to make decisions again, you can accept or refuse any treatment, no matter what you wrote in your living will. · Your state may offer an online registry. This is a place where you can store your living will online so the doctors and nurses who need to treat you can find it right away. What should you think about when creating a living will? Talk about your end-of-life wishes with your family members and your doctor. Let them know what you want. That way the people making decisions for you won't be surprised by your choices. Think about these questions as you make your living will: · Do you know enough about life support methods that might be used? If not, talk to your doctor so you know what might be done if you can't breathe on your own, your heart stops, or you're unable to swallow. · What things would you still want to be able to do after you receive life-support methods? Would you want to be able to walk? To speak? To eat on your own? To live without the help of machines? · If you have a choice, where do you want to be cared for? In your home? At a hospital or nursing home? · Do you want certain Mormonism practices performed if you become very ill? · If you have a choice at the end of your life, where would you prefer to die? At home? In a hospital or nursing home? Somewhere else? · Would you prefer to be buried or cremated? · Do you want your organs to be donated after you die? What should you do with your living will? · Make sure that your family members and your health care agent have copies of your living will. · Give your doctor a copy of your living will to keep in your medical record. If you have more than one doctor, make sure that each one has a copy. · You may want to put a copy of your living will where it can be easily found. Where can you learn more? Go to http://trent-karen.info/. Enter U080 in the search box to learn more about \"Learning About Living Ryne. \" Current as of: September 24, 2016 Content Version: 11.4 © 5677-0012 [employer]. Care instructions adapted under license by [employer] (which disclaims liability or warranty for this information). If you have questions about a medical condition or this instruction, always ask your healthcare professional. [address] any warranty or liability for your use of this information. Advance Directives: Care Instructions Your Care Instructions An advance directive is a legal way to state your wishes at the end of your life. It tells your family and your doctor what to do if you can no longer say what you want. There are two main types of advance directives. You can change them any time that your wishes change. · A living will tells your family and your doctor your wishes about life support and other treatment.  
· A durable power of  for health care lets you name a person to make treatment decisions for you when you can't speak for yourself. This person is called a health care agent. If you do not have an advance directive, decisions about your medical care may be made by a doctor or a  who doesn't know you. It may help to think of an advance directive as a gift to the people who care for you. If you have one, they won't have to make tough decisions by themselves. Follow-up care is a key part of your treatment and safety. Be sure to make and go to all appointments, and call your doctor if you are having problems. It's also a good idea to know your test results and keep a list of the medicines you take. How can you care for yourself at home? · Discuss your wishes with your loved ones and your doctor. This way, there are no surprises. · Many states have a unique form. Or you might use a universal form that has been approved by many states. This kind of form can sometimes be completed and stored online. Your electronic copy will then be available wherever you have a connection to the Internet. In most cases, doctors will respect your wishes even if you have a form from a different state. · You don't need a  to do an advance directive. But you may want to get legal advice. · Think about these questions when you prepare an advance directive: ¨ Who do you want to make decisions about your medical care if you are not able to? Many people choose a family member or close friend. ¨ Do you know enough about life support methods that might be used? If not, talk to your doctor so you understand. ¨ What are you most afraid of that might happen? You might be afraid of having pain, losing your independence, or being kept alive by machines. ¨ Where would you prefer to die? Choices include your home, a hospital, or a nursing home. ¨ Would you like to have information about hospice care to support you and your family? ¨ Do you want to donate organs when you die? ¨ Do you want certain Baptism practices performed before you die? If so, put your wishes in the advance directive. · Read your advance directive every year, and make changes as needed. When should you call for help? Be sure to contact your doctor if you have any questions. Where can you learn more? Go to http://trent-karen.info/. Enter R264 in the search box to learn more about \"Advance Directives: Care Instructions. \" Current as of: September 24, 2016 Content Version: 11.4 © 8090-1106 [employer]. Care instructions adapted under license by [employer] (which disclaims liability or warranty for this information). If you have questions about a medical condition or this instruction, always ask your healthcare professional. [address] any warranty or liability for your use of this information. Introducing hospitals & HEALTH SERVICES! Iliana Payne introduces [employer] patient portal. Now you can access parts of your medical record, email your doctor's office, and request medication refills online. 1. In your internet browser, go to [email or web address] 2. Click on the First Time User? Click Here link in the Sign In box. You will see the New Member Sign Up page. 3. Enter your Distractify Access Code exactly as it appears below. You will not need to use this code after youve completed the sign-up process. If you do not sign up before the expiration date, you must request a new code. · Dist[employer] Access Code: [email or web address] Expires: 11/28/2017  3:56 PM 
 
4. Enter the last four digits of your Social Security Number (xxxx) and Date of Birth (mm/dd/yyyy) as indicated and click Submit. You will be taken to the next sign-up page. 5. Create a Distractify ID. This will be your Distractify login ID and cannot be changed, so think of one that is secure and easy to remember. 6. Create a Thermal Nomad password. You can change your password at any time. 7. Enter your Password Reset Question and Answer. This can be used at a later time if you forget your password. 8. Enter your e-mail address. You will receive e-mail notification when new information is available in [address] 9. Click Sign Up. You can now view and download portions of your medical record. 10. Click the Download Summary menu link to download a portable copy of your medical information. If you have questions, please visit the Frequently Asked Questions section of the Ther[employer] website. Remember, Thermal Nomad is NOT to be used for urgent needs. For medical emergencies, dial 911. Now available from your iPhone and Android! Please provide this summary of care documentation to your next provider. Your primary care clinician is listed as Desire Vázquez. If you have any questions after today's visit, please call [phone].

## 2017-10-30 NOTE — PATIENT INSTRUCTIONS
[facility]   Statement to Patients  April 1, 2014      In an effort to ensure the large volume of patient prescription refills is processed in the most efficient and expeditious manner, we are asking our patients to assist us by calling your Pharmacy for all prescription refills, this will include also your  Mail Order Pharmacy. The pharmacy will contact our office electronically to continue the refill process. Please do not wait until the last minute to call your pharmacy. We need at least 48 hours (2days) to fill prescriptions. We also encourage you to call your pharmacy before going to  your prescription to make sure it is ready. With regard to controlled substance prescription refill requests (narcotic refills) that need to be picked up at our office, we ask your cooperation by providing us with at least 72 hours (3days) notice that you will need a refill. We will not refill narcotic prescription refill requests after 4:00pm on any weekday, Monday through Thursday, or after 2:00pm on Fridays, or on the weekends. We encourage everyone to explore another way of getting your prescription refill request processed using [employer], our patient web portal through our electronic medical record system. [employer] is an efficient and effective way to communicate your medication request directly to the office and  downloadable as an jonathan on your smart phone . [employer] also features a review functionality that allows you to view your medication list as well as leave messages for your physician. Are you ready to get connected? If so please review the attatched instructions or speak to any of our staff to get you set up right away! Thank you so much for your cooperation. Should you have any questions please contact our Practice Administrator.     The Physicians and Staff,  [facility] [address] [address]  What is a living will?    A living will is a legal form you use to write down the kind of care you want at the end of your life. It is used by the health professionals who will treat you if you aren't able to decide for yourself. If you put your wishes in writing, your loved ones and others will know what kind of care you want. They won't need to guess. This can ease your mind and be helpful to others. A living will is not the same as an estate or property will. An estate will explains what you want to happen with your money and property after you die. Is a living will a legal document? A living will is a legal document. Each state has its own laws about living blackman. If you move to another state, make sure that your living will is legal in the state where you now live. Or you might use a universal form that has been approved by many states. This kind of form can sometimes be completed and stored online. Your electronic copy will then be available wherever you have a connection to the Internet. In most cases, doctors will respect your wishes even if you have a form from a different state. · You don't need an  to complete a living will. But legal advice can be helpful if your state's laws are unclear, your health history is complicated, or your family can't agree on what should be in your living will. · You can change your living will at any time. Some people find that their wishes about end-of-life care change as their health changes. · In addition to making a living will, think about completing a medical power of  form. This form lets you name the person you want to make end-of-life treatment decisions for you (your \"health care agent\") if you're not able to. Many hospitals and nursing homes will give you the forms you need to complete a living will and a medical power of . · Your living will is used only if you can't make or communicate decisions for yourself anymore.  If you become able to make decisions again, you can accept or refuse any treatment, no matter what you wrote in your living will. · Your state may offer an online registry. This is a place where you can store your living will online so the doctors and nurses who need to treat you can find it right away. What should you think about when creating a living will? Talk about your end-of-life wishes with your family members and your doctor. Let them know what you want. That way the people making decisions for you won't be surprised by your choices. Think about these questions as you make your living will:  · Do you know enough about life support methods that might be used? If not, talk to your doctor so you know what might be done if you can't breathe on your own, your heart stops, or you're unable to swallow. · What things would you still want to be able to do after you receive life-support methods? Would you want to be able to walk? To speak? To eat on your own? To live without the help of machines? · If you have a choice, where do you want to be cared for? In your home? At a hospital or nursing home? · Do you want certain Adventist practices performed if you become very ill? · If you have a choice at the end of your life, where would you prefer to die? At home? In a hospital or nursing home? Somewhere else? · Would you prefer to be buried or cremated? · Do you want your organs to be donated after you die? What should you do with your living will? · Make sure that your family members and your health care agent have copies of your living will. · Give your doctor a copy of your living will to keep in your medical record. If you have more than one doctor, make sure that each one has a copy. · You may want to put a copy of your living will where it can be easily found. Where can you learn more? Go to http://trent-karen.info/. Enter F097 in the search box to learn more about \"Learning About Living Perdelmi. \"  Current as of: September 24, 2016  Content Version: 11.4  © 3083-9803 [employer]. Care instructions adapted under license by [employer] (which disclaims liability or warranty for this information). If you have questions about a medical condition or this instruction, always ask your healthcare professional. [address] any warranty or liability for your use of this information. Advance Directives: Care Instructions  Your Care Instructions  An advance directive is a legal way to state your wishes at the end of your life. It tells your family and your doctor what to do if you can no longer say what you want. There are two main types of advance directives. You can change them any time that your wishes change. · A living will tells your family and your doctor your wishes about life support and other treatment. · A durable power of  for health care lets you name a person to make treatment decisions for you when you can't speak for yourself. This person is called a health care agent. If you do not have an advance directive, decisions about your medical care may be made by a doctor or a  who doesn't know you. It may help to think of an advance directive as a gift to the people who care for you. If you have one, they won't have to make tough decisions by themselves. Follow-up care is a key part of your treatment and safety. Be sure to make and go to all appointments, and call your doctor if you are having problems. It's also a good idea to know your test results and keep a list of the medicines you take. How can you care for yourself at home? · Discuss your wishes with your loved ones and your doctor. This way, there are no surprises. · Many states have a unique form. Or you might use a universal form that has been approved by many states. This kind of form can sometimes be completed and stored online.  Your electronic copy will then be available wherever you have a connection to the Internet. In most cases, doctors will respect your wishes even if you have a form from a different state. · You don't need a  to do an advance directive. But you may want to get legal advice. · Think about these questions when you prepare an advance directive:  ¨ Who do you want to make decisions about your medical care if you are not able to? Many people choose a family member or close friend. ¨ Do you know enough about life support methods that might be used? If not, talk to your doctor so you understand. ¨ What are you most afraid of that might happen? You might be afraid of having pain, losing your independence, or being kept alive by machines. ¨ Where would you prefer to die? Choices include your home, a hospital, or a nursing home. ¨ Would you like to have information about hospice care to support you and your family? ¨ Do you want to donate organs when you die? ¨ Do you want certain Baptist practices performed before you die? If so, put your wishes in the advance directive. · Read your advance directive every year, and make changes as needed. When should you call for help? Be sure to contact your doctor if you have any questions. Where can you learn more? Go to http://trent-karen.info/. Enter R264 in the search box to learn more about \"Advance Directives: Care Instructions. \"  Current as of: September 24, 2016  Content Version: 11.4  © 9422-0797 [employer]. Care instructions adapted under license by [employer] (which disclaims liability or warranty for this information). If you have questions about a medical condition or this instruction, always ask your healthcare professional. [address] any warranty or liability for your use of this information.

## 2017-10-30 NOTE — PROGRESS NOTES
Patient is here for follow up. He has not had any seizures since last appt. New medication seems to working great. He said sometime he feels like the worlds stopped but that is it.

## 2017-10-31 NOTE — PROGRESS NOTES
Mary Morillo is a 64 y.o. male who presents with the following  Chief Complaint   Patient presents with    Follow-up       HPI  Patient comes in for a follow up for partial epilepsy. Was on Keppra with side effects of constipation and GI problems. Depakote he felt bad and had breakthrough seizures. We tried Vimpat most recently was 800$ so he never took this. He has side effects to Aptiom as in he states he will Rash when out in the sun, mood changes, agitation, aggression, just does not feel right on this medication and the wife agrees. Last visit we switched to Trokendi XR and up to 200 mg for his seizures and migraines. Not have any migrianes or seizures. Not had to use the zomig nasal at all. Overall feels good and no complaints. Sometimes will have a feeling of which he gets dizzy or just off with the trokendi but very infrequent. Wife agrees he is better with this then Aptiom   Allergies   Allergen Reactions    Aptiom [Eslicarbazepine] Anxiety and Other (comments)     Pt reports anger and sores as a reaction to aptiom.  Levetiracetam Other (comments)     Generic form causes severe Headache       Current Outpatient Prescriptions   Medication Sig    topiramate ER (TROKENDI XR) 200 mg capsule Take 1 capsule by mouth nightly    ZOLMitriptan (ZOMIG) 5 mg nasal solution 1 spray in nostril at HA onset. May repeat again in 2 hours X 1 dose. No mor then 2 doses in 24 hours    olmesartan (BENICAR) 20 mg tablet Take 20 mg by mouth daily. No current facility-administered medications for this visit.         History   Smoking Status    Former Smoker   Smokeless Tobacco    Never Used       Past Medical History:   Diagnosis Date    Hypertension     Migraines     Seizures ([address]     Stroke [facility])        Past Surgical History:   Procedure Laterality Date    HX ACL RECONSTRUCTION      right knee 6 times       Family History   Problem Relation Age of Onset    Cancer Father      prostate    Stroke Maternal Aunt     Diabetes Sister        Social History     Social History    Marital status:      Spouse name: N/A    Number of children: N/A    Years of education: N/A     Social History Main Topics    Smoking status: Former Smoker    Smokeless tobacco: Never Used    Alcohol use Yes      Comment: rarely    Drug use: No    Sexual activity: Not Asked     Other Topics Concern    None     Social History Narrative       Review of Systems   Constitutional: Negative for malaise/fatigue. HENT: Negative for ear pain, hearing loss and tinnitus. Eyes: Negative for blurred vision, double vision and photophobia. Respiratory: Negative for shortness of breath and wheezing. Cardiovascular: Negative for chest pain and palpitations. Gastrointestinal: Negative for nausea and vomiting. Neurological: Negative for dizziness, tingling, seizures, loss of consciousness, weakness and headaches. Remainder of comprehensive review is negative. Physical Exam :    Visit Vitals    /66    Ht 6' 2\" (1.88 m)    Wt 120.7 kg (266 lb)    BMI 34.15 kg/m2       General: Well defined, nourished, and groomed individual in no acute distress.    Neck: Supple, nontender, no bruits, no pain with resistance to active range of motion.    Heart: Regular rate and rhythm, no murmurs, rub, or gallop. Normal S1S2. Lungs: Clear to auscultation bilaterally with equal chest expansion, no cough, no wheeze  Musculoskeletal: Extremities revealed no edema and had full range of motion of joints.    Psych: Good mood and bright affect    NEUROLOGICAL EXAMINATION:    Mental Status: Alert and oriented to person, place, and time    Cranial Nerves:    II, III, IV, VI: Visual acuity grossly intact. Visual fields are normal.    Pupils are equal, round, and reactive to light and accommodation.    Extra-ocular movements are full and fluid. Fundoscopic exam was benign, no ptosis or nystagmus.    V-XII: Hearing is grossly intact.  Facial features are symmetric, with normal sensation and strength. The palate rises symmetrically and the tongue protrudes midline. Sternocleidomastoids 5/5. Motor Examination: Normal tone, bulk, and strength, 5/5 muscle strength throughout. Coordination: Finger to nose was normal. No resting or intention tremor    Gait and Station: Steady while walking. Normal arm swing. No pronator drift. No muscle wasting or fasiculations noted. Reflexes: DTRs 2+ throughout. Results for orders placed or performed during the hospital encounter of 10/10/17   CBC WITH AUTOMATED DIFF   Result Value Ref Range    WBC 7.6 4.1 - 11.1 K/uL    RBC 4.82 4.10 - 5.70 M/uL    HGB 14.4 12.1 - 17.0 g/dL    HCT 42.7 36.6 - 50.3 %    MCV 88.6 80.0 - 99.0 FL    MCH 29.9 26.0 - 34.0 PG    MCHC 33.7 30.0 - 36.5 g/dL    RDW 13.3 11.5 - 14.5 %    PLATELET 034 154 - 300 K/uL    NEUTROPHILS 62 32 - 75 %    LYMPHOCYTES 27 12 - 49 %    MONOCYTES 8 5 - 13 %    EOSINOPHILS 2 0 - 7 %    BASOPHILS 1 0 - 1 %    ABS. NEUTROPHILS 4.8 1.8 - 8.0 K/UL    ABS. LYMPHOCYTES 2.0 0.8 - 3.5 K/UL    ABS. MONOCYTES 0.6 0.0 - 1.0 K/UL    ABS. EOSINOPHILS 0.2 0.0 - 0.4 K/UL    ABS. BASOPHILS 0.0 0.0 - 0.1 K/UL   METABOLIC PANEL, COMPREHENSIVE   Result Value Ref Range    Sodium 140 136 - 145 mmol/L    Potassium 3.8 3.5 - 5.1 mmol/L    Chloride 107 97 - 108 mmol/L    CO2 23 21 - 32 mmol/L    Anion gap 10 5 - 15 mmol/L    Glucose 136 (H) 65 - 100 mg/dL    BUN 15 6 - 20 MG/DL    Creatinine 0.90 0.70 - 1.30 MG/DL    BUN/Creatinine ratio 17 12 - 20      GFR est AA >60 >60 ml/min/1.73m2    GFR est non-AA >60 >60 ml/min/1.73m2    Calcium 8.6 8.5 - 10.1 MG/DL    Bilirubin, total 0.4 0.2 - 1.0 MG/DL    ALT (SGPT) 57 12 - 78 U/L    AST (SGOT) 24 15 - 37 U/L    Alk.  phosphatase 66 45 - 117 U/L    Protein, total 7.5 6.4 - 8.2 g/dL    Albumin 4.1 3.5 - 5.0 g/dL    Globulin 3.4 2.0 - 4.0 g/dL    A-G Ratio 1.2 1.1 - 2.2     TROPONIN I   Result Value Ref Range    Troponin-I, Qt. <0.04 <0.05 ng/mL   LIPASE   Result Value Ref Range    Lipase 176 73 - 393 U/L   NT-PRO BNP   Result Value Ref Range    NT pro-BNP 38 0 - 125 PG/ML   EKG, 12 LEAD, INITIAL   Result Value Ref Range    Ventricular Rate 74 BPM    Atrial Rate 74 BPM    P-R Interval 160 ms    QRS Duration 94 ms    Q-T Interval 400 ms    QTC Calculation (Bezet) 444 ms    Calculated P Axis 43 degrees    Calculated R Axis 67 degrees    Calculated T Axis 93 degrees    Diagnosis       Normal sinus rhythm  Normal ECG  When compared with ECG of 12-JUL-2010 18:20,  No significant change was found  Confirmed by Kathy SALINAS, Hang Shook ([id number]) on 10/10/2017 4:22:41 PM         Orders Placed This Encounter    EEG AMB NEURO ([id number])     Standing Status:   Future     Standing Expiration Date:   4/30/2018     Order Specific Question:   Reason for Exam:     Answer:   seizures. 1. Partial epilepsy with impairment of consciousness ([facility] [address]        Follow-up Disposition:  Return in about 3 months (around 1/30/2018). Partial epilepsy doing well with switch to Trokendi XR. Keep this at 200 mg as he has been feeling good and it is not interrupting his mood or anything. Also has not had any migraines or seizures on this. Get a repeat EEG to evaluate his seizures further. Call with any concerns.          This note will not be viewable in [employer]

## 2017-11-09 ENCOUNTER — CLINICAL SUPPORT (OUTPATIENT)
Dept: CARDIOLOGY CLINIC | Age: 57
End: 2017-11-09

## 2017-11-09 DIAGNOSIS — R42 DIZZINESS AND GIDDINESS: ICD-10-CM

## 2017-11-09 DIAGNOSIS — R06.09 DOE (DYSPNEA ON EXERTION): ICD-10-CM

## 2017-11-09 DIAGNOSIS — I10 ESSENTIAL HYPERTENSION: ICD-10-CM

## 2017-11-09 DIAGNOSIS — R07.9 CHEST PAIN, UNSPECIFIED TYPE: Primary | ICD-10-CM

## 2017-11-09 NOTE — PROGRESS NOTES
Explained procedure to patient, monitoring EKG/HR/BP, obtaining resting images, walking on treadmill, obtaining post exercise images, and risks/discomforts. All concerns and questions addressed. Consent obtained. See scanned report. Dr. Conchis Ware ordered and Dr. Conchis Ware read study. ID verified per protocol. Pt  reported no symptoms at completion of protocol.

## 2017-11-13 ENCOUNTER — TELEPHONE (OUTPATIENT)
Dept: CARDIOLOGY CLINIC | Age: 57
End: 2017-11-13

## 2017-11-13 NOTE — TELEPHONE ENCOUNTER
Cardiac testing  Exercise Stress Echo 11/9/17 - 7:07. EF 60%. Normal study. Mr. Christel Oneal was seen as a new patient by Dr. Tk Singh on 10/24/17. Referred by Dr. Lexis Jim for evaluation of SOB. I have notified Mr. Christel Oneal of normal stress test results. We briefly discussed the role of CT heart scan to further evaluate for subclinical CAD. He will follow up with Dr. Lexis Jim moving forward. I have encouraged him to call us back with any additional questions or concerns.

## 2018-04-07 DIAGNOSIS — G40.209 PARTIAL EPILEPSY WITH IMPAIRMENT OF CONSCIOUSNESS (HCC): ICD-10-CM

## 2018-04-09 RX ORDER — TOPIRAMATE 200 MG/1
CAPSULE, EXTENDED RELEASE ORAL
Qty: 30 CAP | Refills: 5 | Status: SHIPPED | OUTPATIENT
Start: 2018-04-09

## 2018-07-27 ENCOUNTER — TELEPHONE (OUTPATIENT)
Dept: NEUROLOGY | Age: 58
End: 2018-07-27

## 2018-07-27 DIAGNOSIS — G43.909 MIGRAINE WITHOUT STATUS MIGRAINOSUS, NOT INTRACTABLE, UNSPECIFIED MIGRAINE TYPE: ICD-10-CM

## 2018-07-27 RX ORDER — ZOLMITRIPTAN 5 MG/1
SPRAY NASAL
Qty: 9 CONTAINER | Refills: 2 | Status: SHIPPED | OUTPATIENT
Start: 2018-07-27

## 2018-07-27 NOTE — TELEPHONE ENCOUNTER
Approved refill of zomig nasal spray to [facility] on [address]. Patient's wife informed that office visit is needed for additional refills. They will call back to verify if insurance is accepted. They tried to get this filled through PCP, but were told no. Provided her with website [email or web address] in order for her to print out savings card.

## 2018-07-27 NOTE — TELEPHONE ENCOUNTER
----- Message from Leti Camacho sent at 7/27/2018 12:34 PM EDT -----  Regarding: MARISSA García/ chance  Karol Ojeda Stands, wife, called to request a refill on the pt's \"zomig\".  The Rx can be [facility] to [facility] [phone]      Best contact number is [phone]

## 2019-07-21 ENCOUNTER — HOSPITAL ENCOUNTER (EMERGENCY)
Age: 59
Discharge: ANOTHER HOSPITAL | End: 2019-07-21
Attending: STUDENT IN AN ORGANIZED HEALTH CARE EDUCATION/TRAINING PROGRAM
Payer: COMMERCIAL

## 2019-07-21 ENCOUNTER — APPOINTMENT (OUTPATIENT)
Dept: GENERAL RADIOLOGY | Age: 59
End: 2019-07-21
Attending: STUDENT IN AN ORGANIZED HEALTH CARE EDUCATION/TRAINING PROGRAM
Payer: COMMERCIAL

## 2019-07-21 VITALS
OXYGEN SATURATION: 96 % | RESPIRATION RATE: 12 BRPM | DIASTOLIC BLOOD PRESSURE: 79 MMHG | TEMPERATURE: 98.7 F | HEART RATE: 64 BPM | SYSTOLIC BLOOD PRESSURE: 155 MMHG

## 2019-07-21 DIAGNOSIS — R07.9 CHEST PAIN, UNSPECIFIED TYPE: Primary | ICD-10-CM

## 2019-07-21 LAB
ALBUMIN SERPL-MCNC: 4.2 G/DL (ref 3.5–5)
ALBUMIN/GLOB SERPL: 1.4 {RATIO} (ref 1.1–2.2)
ALP SERPL-CCNC: 72 U/L (ref 45–117)
ALT SERPL-CCNC: 69 U/L (ref 12–78)
ANION GAP SERPL CALC-SCNC: 12 MMOL/L (ref 5–15)
AST SERPL-CCNC: 25 U/L (ref 15–37)
BASOPHILS # BLD: 0 K/UL (ref 0–0.1)
BASOPHILS NFR BLD: 0 % (ref 0–1)
BILIRUB SERPL-MCNC: 0.2 MG/DL (ref 0.2–1)
BUN SERPL-MCNC: 15 MG/DL (ref 6–20)
BUN/CREAT SERPL: 19 (ref 12–20)
CALCIUM SERPL-MCNC: 8.5 MG/DL (ref 8.5–10.1)
CHLORIDE SERPL-SCNC: 104 MMOL/L (ref 97–108)
CO2 SERPL-SCNC: 24 MMOL/L (ref 21–32)
COMMENT, HOLDF: NORMAL
CREAT SERPL-MCNC: 0.81 MG/DL (ref 0.7–1.3)
DIFFERENTIAL METHOD BLD: ABNORMAL
EOSINOPHIL # BLD: 0.2 K/UL (ref 0–0.4)
EOSINOPHIL NFR BLD: 2 % (ref 0–7)
ERYTHROCYTE [DISTWIDTH] IN BLOOD BY AUTOMATED COUNT: 13.3 % (ref 11.5–14.5)
GLOBULIN SER CALC-MCNC: 3 G/DL (ref 2–4)
GLUCOSE SERPL-MCNC: 105 MG/DL (ref 65–100)
HCT VFR BLD AUTO: 41.5 % (ref 36.6–50.3)
HGB BLD-MCNC: 14.2 G/DL (ref 12.1–17)
LYMPHOCYTES # BLD: 2.4 K/UL
LYMPHOCYTES NFR BLD: 30 % (ref 12–49)
MCH RBC QN AUTO: 30.5 PG (ref 26–34)
MCHC RBC AUTO-ENTMCNC: 34.2 G/DL (ref 30–36.5)
MCV RBC AUTO: 89.2 FL (ref 80–99)
MONOCYTES # BLD: 0.8 K/UL (ref 0–1)
MONOCYTES NFR BLD: 10 % (ref 5–13)
NEUTS SEG # BLD: 4.7 K/UL (ref 1.8–8)
NEUTS SEG NFR BLD: 58 % (ref 32–75)
PLATELET # BLD AUTO: 276 K/UL (ref 150–400)
PMV BLD AUTO: 8.6 FL (ref 8.9–12.9)
POTASSIUM SERPL-SCNC: 3.6 MMOL/L (ref 3.5–5.1)
PROT SERPL-MCNC: 7.2 G/DL (ref 6.4–8.2)
RBC # BLD AUTO: 4.65 M/UL (ref 4.1–5.7)
SAMPLES BEING HELD,HOLD: NORMAL
SODIUM SERPL-SCNC: 140 MMOL/L (ref 136–145)
TROPONIN I SERPL-MCNC: <0.05 NG/ML
WBC # BLD AUTO: 8 K/UL (ref 4.1–11.1)

## 2019-07-21 PROCEDURE — 74011250637 HC RX REV CODE- 250/637: Performed by: STUDENT IN AN ORGANIZED HEALTH CARE EDUCATION/TRAINING PROGRAM

## 2019-07-21 PROCEDURE — 80053 COMPREHEN METABOLIC PANEL: CPT

## 2019-07-21 PROCEDURE — 99285 EMERGENCY DEPT VISIT HI MDM: CPT

## 2019-07-21 PROCEDURE — 71046 X-RAY EXAM CHEST 2 VIEWS: CPT

## 2019-07-21 PROCEDURE — 84484 ASSAY OF TROPONIN QUANT: CPT

## 2019-07-21 PROCEDURE — 93005 ELECTROCARDIOGRAM TRACING: CPT

## 2019-07-21 PROCEDURE — 85025 COMPLETE CBC W/AUTO DIFF WBC: CPT

## 2019-07-21 PROCEDURE — 36415 COLL VENOUS BLD VENIPUNCTURE: CPT

## 2019-07-21 RX ORDER — GUAIFENESIN 100 MG/5ML
324 LIQUID (ML) ORAL
Status: COMPLETED | OUTPATIENT
Start: 2019-07-21 | End: 2019-07-21

## 2019-07-21 RX ORDER — NITROGLYCERIN 0.4 MG/1
0.4 TABLET SUBLINGUAL
Status: DISCONTINUED | OUTPATIENT
Start: 2019-07-21 | End: 2019-07-21 | Stop reason: HOSPADM

## 2019-07-21 RX ORDER — ROSUVASTATIN CALCIUM 5 MG/1
10 TABLET, COATED ORAL
COMMUNITY

## 2019-07-21 RX ADMIN — ASPIRIN 81 MG 324 MG: 81 TABLET ORAL at 13:53

## 2019-07-21 NOTE — ED TRIAGE NOTES
Pt presents to ED with c/o intermittent left chest pain with pain into neck. Pt has had four episodes of the chest pain since yesterday. Pt states some associated dyspnea. Pt states he had some \"tingling\" in left hand.

## 2019-07-21 NOTE — ED NOTES
TRANSFER - OUT REPORT:    Verbal report given to Sylvain Sexton RN(name) on Brett Lamar  being transferred to [facility] ED(unit) for routine progression of care       Report consisted of patients Situation, Background, Assessment and   Recommendations(SBAR). Information from the following report(s) SBAR, ED Summary, MAR, Recent Results and Cardiac Rhythm NSR to sinus bradycardia was reviewed with the receiving nurse. Lines:   Peripheral IV 07/21/19 Left Antecubital (Active)   Site Assessment Clean, dry, & intact 7/21/2019  1:28 PM   Phlebitis Assessment 0 7/21/2019  1:28 PM   Infiltration Assessment 0 7/21/2019  1:28 PM   Dressing Status Clean, dry, & intact 7/21/2019  1:28 PM   Dressing Type Tape;Transparent 7/21/2019  1:28 PM   Hub Color/Line Status Pink;Flushed;Patent 7/21/2019  1:28 PM   Action Taken Blood drawn 7/21/2019  1:28 PM        Opportunity for questions and clarification was provided.       Patient transported with:   Monitor

## 2019-07-21 NOTE — ED NOTES
Timeout: EMTALA complete. Report to ISABEL Persaud-EMT of [facility]. Discharge or Transfer Assessment: Patient A&O x4 and in no distress. Physical re-examination reveals some improvement in pts condition with reassessment of vital signs completed at the time of admission transfer and/or discharge. Pt is stable for transfer to [address] [place] [address] ED.

## 2019-07-21 NOTE — ED PROVIDER NOTES
Mr. Phil Valerio is a 49-year-old male presenting to the emergency department for left-sided chest pain. Patient states that 2 days ago he had an episode of left-sided chest pain radiating into the left jaw episode lasted a few minutes he describes it as a pressure that \"it got my attention\". Patient states that he was completely symptom 3 and then last night while fixing dinner he had another episode of left-sided chest pain radiating into the left jaw with associated tingling in his left arm, diaphoresis, lightheadedness. Patient states this is not associated with any activity level since last a few minutes and then resolve. Patient has a history of high blood pressure, hypercholesteremia, former smoker. Pt is currently chest pain free.              Past Medical History:   Diagnosis Date    Hypertension     Migraines     Seizures ([facility] [address]     Stroke [facility])        Past Surgical History:   Procedure Laterality Date    HX ACL RECONSTRUCTION      right knee 6 times         Family History:   Problem Relation Age of Onset    Cancer Father         prostate    Stroke Maternal Aunt     Diabetes Sister        Social History     Socioeconomic History    Marital status:      Spouse name: Not on file    Number of children: Not on file    Years of education: Not on file    Highest education level: Not on file   Occupational History    Not on file   Social Needs    Financial resource strain: Not on file    Food insecurity:     Worry: Not on file     Inability: Not on file    Transportation needs:     Medical: Not on file     Non-medical: Not on file   Tobacco Use    Smoking status: Former Smoker    Smokeless tobacco: Never Used   Substance and Sexual Activity    Alcohol use: Yes     Comment: rarely    Drug use: No    Sexual activity: Not on file   Lifestyle    Physical activity:     Days per week: Not on file     Minutes per session: Not on file    Stress: Not on file   Relationships    Social connections: Talks on phone: Not on file     Gets together: Not on file     Attends Orthodox service: Not on file     Active member of club or organization: Not on file     Attends meetings of clubs or organizations: Not on file     Relationship status: Not on file    Intimate partner violence:     Fear of current or ex partner: Not on file     Emotionally abused: Not on file     Physically abused: Not on file     Forced sexual activity: Not on file   Other Topics Concern    Not on file   Social History Narrative    Not on file         ALLERGIES: Aptiom [eslicarbazepine] and Levetiracetam    Review of Systems   Respiratory: Positive for chest tightness. Cardiovascular: Positive for chest pain. Neurological: Positive for light-headedness. All other systems reviewed and are negative. Vitals:    07/21/19 1320   BP: 170/74   Pulse: 66   Resp: 12   Temp: 98.7 °F (37.1 °C)   SpO2: 98%            Physical Exam   Constitutional: He appears well-developed and well-nourished. HENT:   Head: Normocephalic and atraumatic. Eyes: Pupils are equal, round, and reactive to light. EOM are normal.   Neck: Normal range of motion. Neck supple. Cardiovascular: Normal rate and regular rhythm. Pulmonary/Chest: Effort normal and breath sounds normal.   Abdominal: Soft. Musculoskeletal:        Right lower leg: Normal.        Left lower leg: Normal.   Neurological: He is alert. Skin: Skin is warm and dry. Psychiatric: He has a normal mood and affect. Nursing note and vitals reviewed. MDM  Number of Diagnoses or Management Options  Chest pain, unspecified type:   Diagnosis management comments: A/P: ACS, stable angina, atypical chest pain. 3year-old male presenting with concerns of unstable angina/ACS. CBC, CMP, troponin, EKG, chest x-ray PA and lateral, aspirin 324 mg p.o. now.        Amount and/or Complexity of Data Reviewed  Clinical lab tests: ordered and reviewed  Tests in the radiology section of CPT®: ordered and reviewed  Independent visualization of images, tracings, or specimens: yes    Risk of Complications, Morbidity, and/or Mortality  Presenting problems: moderate  Diagnostic procedures: moderate  Management options: moderate    Patient Progress  Patient progress: stable         Procedures      ED EKG interpretation:  Rhythm: normal sinus rhythm; and regular . Rate (approx.): 60; Axis: normal; P wave: normal; QRS interval: normal ; ST/T wave: normal; in  Lead: II ; Other findings:unchanged from ECG on 10/10/17 normal.   EKG documented by Es Chand MD,    3:32 PM  Went to update pt on results and pt began to experience left jaw and chest pain. Repeat ECG performed showing T wave inversion in V4 and III. Spoke to Issa Pastrana MD (cardiology) and agrees pt warrants admission.

## 2019-07-22 LAB
ATRIAL RATE: 54 BPM
ATRIAL RATE: 60 BPM
CALCULATED P AXIS, ECG09: 23 DEGREES
CALCULATED P AXIS, ECG09: 44 DEGREES
CALCULATED R AXIS, ECG10: 27 DEGREES
CALCULATED R AXIS, ECG10: 48 DEGREES
CALCULATED T AXIS, ECG11: 76 DEGREES
CALCULATED T AXIS, ECG11: 8 DEGREES
DIAGNOSIS, 93000: NORMAL
DIAGNOSIS, 93000: NORMAL
P-R INTERVAL, ECG05: 170 MS
P-R INTERVAL, ECG05: 172 MS
Q-T INTERVAL, ECG07: 426 MS
Q-T INTERVAL, ECG07: 442 MS
QRS DURATION, ECG06: 100 MS
QRS DURATION, ECG06: 94 MS
QTC CALCULATION (BEZET), ECG08: 419 MS
QTC CALCULATION (BEZET), ECG08: 426 MS
VENTRICULAR RATE, ECG03: 54 BPM
VENTRICULAR RATE, ECG03: 60 BPM

## 2021-12-01 ENCOUNTER — OFFICE VISIT (OUTPATIENT)
Dept: NEUROLOGY | Age: 61
End: 2021-12-01
Payer: COMMERCIAL

## 2021-12-01 VITALS
HEIGHT: 74 IN | TEMPERATURE: 97.1 F | HEART RATE: 73 BPM | OXYGEN SATURATION: 96 % | WEIGHT: 247.2 LBS | BODY MASS INDEX: 31.73 KG/M2 | DIASTOLIC BLOOD PRESSURE: 84 MMHG | SYSTOLIC BLOOD PRESSURE: 142 MMHG

## 2021-12-01 DIAGNOSIS — R56.9 SEIZURE-LIKE ACTIVITY (HCC): ICD-10-CM

## 2021-12-01 DIAGNOSIS — G40.209 PARTIAL EPILEPSY WITH IMPAIRMENT OF CONSCIOUSNESS (HCC): Primary | ICD-10-CM

## 2021-12-01 PROCEDURE — 99245 OFF/OP CONSLTJ NEW/EST HI 55: CPT | Performed by: NURSE PRACTITIONER

## 2021-12-01 RX ORDER — DIAZEPAM 10 MG/1
TABLET ORAL
Qty: 2 TABLET | Refills: 0 | Status: SHIPPED | OUTPATIENT
Start: 2021-12-01

## 2021-12-01 NOTE — PROGRESS NOTES
Rey Miles is a 61 y.o. male who presents with the following  Chief Complaint   Patient presents with    Epilepsy       HPI    follow up for partial epilepsy. Had been doing really well until yesterday and he had a spell in which he was not sure what happened  Was out working in his shop and had a lapse of time. He states he thinks a few hours went by and he woke back up and was unaware of what happened. He did not bite tongue, wet pants. He was slower for the rest of the day and kind of felt foggy. Did have a slight headache. He did not miss medications   Was not sleep deprived or sick   Has been managing well on Trokendi  mg for years. Wife has not really seen anything else concerns. No testing in a few years. Has been doing really well. Has had Fioricet from PCP and this helps but does not really need any PRN rescues for headaches. Allergies   Allergen Reactions    Aptiom [Eslicarbazepine] Anxiety and Other (comments)     Pt reports anger and sores as a reaction to aptiom.  Levetiracetam Other (comments)     Generic form causes severe Headache       Current Outpatient Medications   Medication Sig    diazePAM (Valium) 10 mg tablet Take 1 tablet by mouth 30-45 minutes before MRI. May repeat X 1 dose. Must have  drive home after.  rosuvastatin (CRESTOR) 5 mg tablet Take 10 mg by mouth nightly.  ZOLMitriptan (ZOMIG) 5 mg nasal solution 1 spray in nostril at HA onset. May repeat again in 2 hours X 1 dose. No mor then 2 doses in 24 hours    TROKENDI  mg capsule TAKE 1 CAPSULE BY MOUTH NIGHTLY    olmesartan (BENICAR) 20 mg tablet Take 20 mg by mouth daily. No current facility-administered medications for this visit.        Social History     Tobacco Use   Smoking Status Former Smoker   Smokeless Tobacco Never Used       Past Medical History:   Diagnosis Date    Hypertension     Migraines     Seizures ([address]     Stroke ([address]        Past Surgical History:   Procedure Laterality Date    HX ACL RECONSTRUCTION      right knee 6 times       Family History   Problem Relation Age of Onset    Cancer Father         prostate    Stroke Maternal Aunt     Diabetes Sister        Social History     Socioeconomic History    Marital status:    Tobacco Use    Smoking status: Former Smoker    Smokeless tobacco: Never Used   Substance and Sexual Activity    Alcohol use: Yes     Comment: rarely    Drug use: No       Review of Systems   Eyes: Positive for blurred vision and photophobia. Negative for double vision. Cardiovascular: Negative for chest pain and palpitations. Gastrointestinal: Positive for nausea. Negative for vomiting. Neurological: Positive for dizziness and headaches. Negative for seizures and loss of consciousness. Remainder of comprehensive review is negative. Physical Exam :    Visit Vitals  BP (!) 142/84   Pulse 73   Temp 97.1 °F (36.2 °C)   Ht 6' 2\" (1.88 m)   Wt 112.1 kg (247 lb 3.2 oz)   SpO2 96%   BMI 31.74 kg/m²       General: Well defined, nourished, and groomed individual in no acute distress.    Neck: Supple, nontender, no bruits, no pain with resistance to active range of motion.    Musculoskeletal: Extremities revealed no edema and had full range of motion of joints.    Psych: Good mood and bright affect    NEUROLOGICAL EXAMINATION:    Mental Status: Alert and oriented to person, place, and time    Cranial Nerves:    II, III, IV, VI: Visual acuity grossly intact. Visual fields are normal.    Pupils are equal, round, and reactive to light and accommodation.    Extra-ocular movements are full and fluid. Fundoscopic exam was benign, no ptosis or nystagmus.    V-XII: Hearing is grossly intact. Facial features are symmetric, with normal sensation and strength. The palate rises symmetrically and the tongue protrudes midline. Sternocleidomastoids 5/5.      Motor Examination: Normal tone, bulk, and strength, 5/5 muscle strength throughout. Coordination: Finger to nose was normal. No resting or intention tremor    Gait and Station: Steady while walking. Normal arm swing. No pronator drift. No muscle wasting or fasiculations noted. Reflexes: DTRs 2+ throughout. Results for orders placed or performed during the hospital encounter of 07/21/19   SAMPLES BEING HELD   Result Value Ref Range    SAMPLES BEING HELD 1RED,1GRN,1BLUE     COMMENT        Add-on orders for these samples will be processed based on acceptable specimen integrity and analyte stability, which may vary by analyte. METABOLIC PANEL, COMPREHENSIVE   Result Value Ref Range    Sodium 140 136 - 145 mmol/L    Potassium 3.6 3.5 - 5.1 mmol/L    Chloride 104 97 - 108 mmol/L    CO2 24 21 - 32 mmol/L    Anion gap 12 5 - 15 mmol/L    Glucose 105 (H) 65 - 100 mg/dL    BUN 15 6 - 20 MG/DL    Creatinine 0.81 0.70 - 1.30 MG/DL    BUN/Creatinine ratio 19 12 - 20      GFR est AA >60 >60 ml/min/1.73m2    GFR est non-AA >60 >60 ml/min/1.73m2    Calcium 8.5 8.5 - 10.1 MG/DL    Bilirubin, total 0.2 0.2 - 1.0 MG/DL    ALT (SGPT) 69 12 - 78 U/L    AST (SGOT) 25 15 - 37 U/L    Alk. phosphatase 72 45 - 117 U/L    Protein, total 7.2 6.4 - 8.2 g/dL    Albumin 4.2 3.5 - 5.0 g/dL    Globulin 3.0 2.0 - 4.0 g/dL    A-G Ratio 1.4 1.1 - 2.2     CBC WITH AUTOMATED DIFF   Result Value Ref Range    WBC 8.0 4.1 - 11.1 K/uL    RBC 4.65 4.10 - 5.70 M/uL    HGB 14.2 12.1 - 17.0 g/dL    HCT 41.5 36.6 - 50.3 %    MCV 89.2 80.0 - 99.0 FL    MCH 30.5 26.0 - 34.0 PG    MCHC 34.2 30.0 - 36.5 g/dL    RDW 13.3 11.5 - 14.5 %    PLATELET 610 356 - 795 K/uL    MPV 8.6 (L) 8.9 - 12.9 FL    NEUTROPHILS 58 32 - 75 %    LYMPHOCYTES 30 12 - 49 %    MONOCYTES 10 5 - 13 %    EOSINOPHILS 2 0 - 7 %    BASOPHILS 0 0 - 1 %    ABS. NEUTROPHILS 4.7 1.8 - 8.0 K/UL    ABS. LYMPHOCYTES 2.4 K/UL    ABS. MONOCYTES 0.8 0.0 - 1.0 K/UL    ABS. EOSINOPHILS 0.2 0.0 - 0.4 K/UL    ABS.  BASOPHILS 0.0 0.0 - 0.1 K/UL    DF AUTOMATED     TROPONIN I   Result Value Ref Range    Troponin-I, Qt. <0.05 <0.05 ng/mL   EKG, 12 LEAD, SUBSEQUENT   Result Value Ref Range    Ventricular Rate 54 BPM    Atrial Rate 54 BPM    P-R Interval 170 ms    QRS Duration 94 ms    Q-T Interval 442 ms    QTC Calculation (Bezet) 419 ms    Calculated P Axis 23 degrees    Calculated R Axis 27 degrees    Calculated T Axis 8 degrees    Diagnosis       Sinus bradycardia  Moderate voltage criteria for LVH, may be normal variant  Inferior infarct , age undetermined  Anterior infarct , age undetermined  Abnormal ECG  Confirmed by Sary Mejias MD. ([id number]) on 7/22/2019 8:56:59 AM     EKG, 12 LEAD, INITIAL   Result Value Ref Range    Ventricular Rate 60 BPM    Atrial Rate 60 BPM    P-R Interval 172 ms    QRS Duration 100 ms    Q-T Interval 426 ms    QTC Calculation (Bezet) 426 ms    Calculated P Axis 44 degrees    Calculated R Axis 48 degrees    Calculated T Axis 76 degrees    Diagnosis       Normal sinus rhythm  Normal ECG  When compared with ECG of 10-OCT-2017 06:45,  No significant change was found  Confirmed by Sary Mejias MD. ([id number]) on 7/22/2019 8:55:59 AM         Orders Placed This Encounter    MRI BRAIN W WO CONT     Standing Status:   Future     Standing Expiration Date:   1/1/2023     Order Specific Question:   STAT Creatinine as indicated     Answer: Yes    EEG AMB NEURO     Order Specific Question:   Reason for Exam:     Answer:   seizures    diazePAM (Valium) 10 mg tablet     Sig: Take 1 tablet by mouth 30-45 minutes before MRI. May repeat X 1 dose. Must have  drive home after. Dispense:  2 Tablet     Refill:  0       1. Partial epilepsy with impairment of consciousness ([address]    2. Seizure-like activity ([address]        trokendi XR is working well at 200 mg nightly for HA, seizures. Not sure spell of unknown origin   Will get an MRI brain to look at temporal lobe lesionvs. Other  Will get an EEG To evaluate for epileptiform.    FU after these Valium for MRI             This note will not be viewable in MyChart

## 2021-12-21 ENCOUNTER — HOSPITAL ENCOUNTER (OUTPATIENT)
Dept: MRI IMAGING | Age: 61
Discharge: HOME | End: 2021-12-21
Attending: NURSE PRACTITIONER
Payer: COMMERCIAL

## 2021-12-21 DIAGNOSIS — R56.9 SEIZURE-LIKE ACTIVITY (HCC): ICD-10-CM

## 2021-12-21 DIAGNOSIS — G40.209 PARTIAL EPILEPSY WITH IMPAIRMENT OF CONSCIOUSNESS (HCC): ICD-10-CM

## 2021-12-21 PROCEDURE — 70553 MRI BRAIN STEM W/O & W/DYE: CPT

## 2021-12-21 PROCEDURE — A9576 INJ PROHANCE MULTIPACK: HCPCS | Performed by: NURSE PRACTITIONER

## 2021-12-21 PROCEDURE — 74011250636 HC RX REV CODE- 250/636: Performed by: NURSE PRACTITIONER

## 2021-12-21 RX ADMIN — GADOTERIDOL 20 ML: 279.3 INJECTION, SOLUTION INTRAVENOUS at 20:19

## 2022-03-19 PROBLEM — I10 ESSENTIAL HYPERTENSION: Status: ACTIVE | Noted: 2017-10-24

## 2022-03-19 PROBLEM — R06.09 DOE (DYSPNEA ON EXERTION): Status: ACTIVE | Noted: 2017-10-24

## 2022-03-20 PROBLEM — R07.9 CHEST PAIN: Status: ACTIVE | Noted: 2017-10-24

## 2022-05-17 ENCOUNTER — HOSPITAL ENCOUNTER (EMERGENCY)
Age: 62
Discharge: HOME | End: 2022-05-17
Attending: EMERGENCY MEDICINE
Payer: COMMERCIAL

## 2022-05-17 ENCOUNTER — APPOINTMENT (OUTPATIENT)
Dept: GENERAL RADIOLOGY | Age: 62
End: 2022-05-17
Attending: EMERGENCY MEDICINE
Payer: COMMERCIAL

## 2022-05-17 VITALS
OXYGEN SATURATION: 94 % | HEIGHT: 78 IN | WEIGHT: 234.79 LBS | BODY MASS INDEX: 27.17 KG/M2 | RESPIRATION RATE: 16 BRPM | TEMPERATURE: 97.7 F | HEART RATE: 63 BPM | DIASTOLIC BLOOD PRESSURE: 71 MMHG | SYSTOLIC BLOOD PRESSURE: 164 MMHG

## 2022-05-17 DIAGNOSIS — S60.221A CONTUSION OF RIGHT HAND, INITIAL ENCOUNTER: Primary | ICD-10-CM

## 2022-05-17 PROCEDURE — 74011250637 HC RX REV CODE- 250/637: Performed by: EMERGENCY MEDICINE

## 2022-05-17 PROCEDURE — 99283 EMERGENCY DEPT VISIT LOW MDM: CPT

## 2022-05-17 PROCEDURE — 73130 X-RAY EXAM OF HAND: CPT

## 2022-05-17 RX ORDER — DOXYCYCLINE 100 MG/1
100 CAPSULE ORAL 2 TIMES DAILY
COMMUNITY

## 2022-05-17 RX ORDER — IBUPROFEN 800 MG/1
800 TABLET ORAL
Status: COMPLETED | OUTPATIENT
Start: 2022-05-17 | End: 2022-05-17

## 2022-05-17 RX ORDER — ASPIRIN 325 MG
50000 TABLET, DELAYED RELEASE (ENTERIC COATED) ORAL
COMMUNITY

## 2022-05-17 RX ADMIN — IBUPROFEN 800 MG: 800 TABLET, FILM COATED ORAL at 09:27

## 2022-05-17 NOTE — DISCHARGE INSTRUCTIONS
Thank you for allowing us to provide you with medical care today. We realize that you have many choices for your emergency care needs. We thank you for choosing [facility]. Please choose us in the future for any continued health care needs. The exam and treatment you received in the Emergency Department were for an emergent problem and are not intended as complete care. It is important that you follow up with a doctor, nurse practitioner, or physician's assistant for ongoing care. If your symptoms worsen or you do not improve as expected and you are unable to reach your usual health care provider, you should return to the Emergency Department. We are available 24 hours a day. Please make an appointment with your health care provider(s) for follow up of your Emergency Department visit. Take this sheet with you when you go to your follow-up visit.

## 2022-05-17 NOTE — ED NOTES
The patient was discharged home by Dr Margarito Phillips in stable condition. The patient is alert and oriented, in no respiratory distress. The patient's diagnosis, condition and treatment were explained. The patient expressed understanding. A discharge plan has been developed. A  was not involved in the process. Aftercare instructions were given. Pt ambulatory out of the ED.

## 2022-05-17 NOTE — ED TRIAGE NOTES
Patient c/o \"jamming his right hand\" on the lower deck on a piece of of wood, at 2 pm yesterday but was able to use it with his tools well in to the evening. Has used ice to the site 3 am took tylenol today. Are is swollen.

## 2022-05-17 NOTE — ED PROVIDER NOTES
80-year-old male presents with right hand pain. Yesterday the patient was using a hammer to pull a nail out of the deck when the nail broke and his hand slammed forward onto the deck. He complains of pain mostly in the third knuckle. He describes it as aching in nature. Is worse with movement. He has not been able to even hold a coffee cup this morning. He did take Tylenol last night and this morning with little relief. He does not rate his pain on a scale. Past Medical History:   Diagnosis Date    Hypertension     Migraines     Seizures ([address]     Stroke [facility])        Past Surgical History:   Procedure Laterality Date    HX ACL RECONSTRUCTION      right knee 6 times         Family History:   Problem Relation Age of Onset    Cancer Father         prostate    Stroke Maternal Aunt     Diabetes Sister        Social History     Socioeconomic History    Marital status:      Spouse name: Not on file    Number of children: Not on file    Years of education: Not on file    Highest education level: Not on file   Occupational History    Not on file   Tobacco Use    Smoking status: Former Smoker    Smokeless tobacco: Never Used   Substance and Sexual Activity    Alcohol use: Yes     Comment: rarely    Drug use: No    Sexual activity: Not on file   Other Topics Concern    Not on file   Social History Narrative    Not on file     Social Determinants of Health     Financial Resource Strain:     Difficulty of Paying Living Expenses: Not on file   Food Insecurity:     Worried About [address] in the Last Year: Not on file    [address] in the Last Year: Not on file   Transportation Needs:     Lack of Transportation (Medical): Not on file    Lack of Transportation (Non-Medical):  Not on file   Physical Activity:     Days of Exercise per Week: Not on file    Minutes of Exercise per Session: Not on file   Stress:     Feeling of Stress : Not on file   Social Connections:  Frequency of Communication with Friends and Family: Not on file    Frequency of Social Gatherings with Friends and Family: Not on file    Attends [employer] Services: Not on file    Active Member of Clubs or Organizations: Not on file    Attends Club or Organization Meetings: Not on file    Marital Status: Not on file   Intimate Partner Violence:     Fear of Current or Ex-Partner: Not on file    Emotionally Abused: Not on file    Physically Abused: Not on file    Sexually Abused: Not on file   Housing Stability:     Unable to Pay for Housing in the Last Year: Not on file    Number of [place] in the Last Year: Not on file    Unstable Housing in the Last Year: Not on file         ALLERGIES: Aptiom [eslicarbazepine] and Levetiracetam    Review of Systems   Constitutional: Negative for fever. HENT: Negative for rhinorrhea. Respiratory: Negative for cough. Cardiovascular: Negative for chest pain. Gastrointestinal: Negative for abdominal pain. Genitourinary: Negative for dysuria. Musculoskeletal: Positive for arthralgias. Skin: Negative for wound. Neurological: Negative for headaches. Psychiatric/Behavioral: Negative for confusion. Vitals:    05/17/22 0912   BP: (!) 177/79   Pulse: 63   Resp: 16   Temp: 97.7 °F (36.5 °C)   SpO2: 94%   Weight: 106.5 kg (234 lb 12.6 oz)   Height: 6' 7\" (2.007 m)            Physical Exam  Vitals and nursing note reviewed. Constitutional:       General: He is not in acute distress. Appearance: Normal appearance. He is not ill-appearing, toxic-appearing or diaphoretic. HENT:      Head: Normocephalic. Eyes:      Extraocular Movements: Extraocular movements intact. Cardiovascular:      Rate and Rhythm: Normal rate. Pulses: Normal pulses. Pulmonary:      Effort: Pulmonary effort is normal. No respiratory distress. Abdominal:      General: There is no distension. Musculoskeletal:         General: Swelling present.  Normal range of motion. Cervical back: Normal range of motion. Comments: Swelling and tenderness over the third metacarpal on the right   Skin:     General: Skin is dry. Capillary Refill: Capillary refill takes less than 2 seconds. Neurological:      Mental Status: He is alert and oriented to person, place, and time. Psychiatric:         Mood and Affect: Mood normal.          MDM  Number of Diagnoses or Management Options  Contusion of right hand, initial encounter  Diagnosis management comments:   Plain film obtained and shows no fracture. Discussed my clinical impression(s), any labs and/or radiology results with the patient. I answered any questions and addressed any concerns. Discussed the importance of following up with their primary care physician and/or specialist(s). Discussed signs or symptoms that would warrant return back to the ER for further evaluation. The patient is agreeable with discharge.          Procedures

## 2022-08-09 ENCOUNTER — OFFICE VISIT (OUTPATIENT)
Dept: NEUROLOGY | Age: 62
End: 2022-08-09
Payer: COMMERCIAL

## 2022-08-09 VITALS
TEMPERATURE: 97.5 F | OXYGEN SATURATION: 95 % | DIASTOLIC BLOOD PRESSURE: 66 MMHG | SYSTOLIC BLOOD PRESSURE: 110 MMHG | HEART RATE: 68 BPM

## 2022-08-09 DIAGNOSIS — G40.209 PARTIAL EPILEPSY WITH IMPAIRMENT OF CONSCIOUSNESS (HCC): Primary | ICD-10-CM

## 2022-08-09 PROCEDURE — 99214 OFFICE O/P EST MOD 30 MIN: CPT | Performed by: NURSE PRACTITIONER

## 2022-08-09 RX ORDER — SUMATRIPTAN 100 MG/1
TABLET, FILM COATED ORAL
COMMUNITY
Start: 2022-07-24

## 2022-08-09 RX ORDER — MIDAZOLAM 5 MG/.1ML
1 SPRAY NASAL
Qty: 2 EACH | Refills: 5 | Status: SHIPPED | OUTPATIENT
Start: 2022-08-09

## 2022-08-10 NOTE — PROGRESS NOTES
Joel Toney is a 64 y.o. male who presents with the following  Chief Complaint   Patient presents with    Epilepsy       HPI    FU for partial epilepsy. Had been doing really well since December but a few weeks ago felt an aura  Was on a ladder at work and felt weird   Came down the ladder and never went into a full seizure but felt like he was close  He does not remember much more then that   Was having some trouble expressing words and with confusion   He did not bite tongue, wet pants. He was slower for the rest of the day and kind of felt foggy. Did have a slight headache. He did not miss medications  Was not sleep deprived or sick  Has been managing well on Trokendi  mg   Increase stress with high PSA and biopsy. Allergies   Allergen Reactions    Aptiom [Eslicarbazepine] Anxiety and Other (comments)     Pt reports anger and sores as a reaction to aptiom.  Levetiracetam Other (comments)     Generic form causes severe Headache. Can do Brand Keppra       Current Outpatient Medications   Medication Sig    SUMAtriptan (IMITREX) 100 mg tablet TAKE 1 TABLET BY MOUTH AS NEEDED FOR MIGRAINEMAY REPEAT IN 2 HOURS MAX 2 PER DAY    midazolam (Nayzilam) 5 mg/spray (0.1 mL) spry 1 [place] by Both Nostrils route daily as needed (seizure).  topiramate ER (Trokendi XR) 50 mg capsule 1 capsule by mouth nightly. Total 250 mg    topiramate ER (Trokendi XR) 200 mg capsule Take 1 capsule by mouth nightly. Total 250 mg    cholecalciferol (VITAMIN D3) (50,000 UNITS /1250 MCG) capsule Take 50,000 Units by mouth every thirty (30) days.  diazePAM (Valium) 10 mg tablet Take 1 tablet by mouth 30-45 minutes before MRI. May repeat X 1 dose. Must have  drive home after. (Patient taking differently: Take 1 tablet by mouth 30-45 minutes before MRI. May repeat X 1 dose. Must have  drive home after.)    rosuvastatin (CRESTOR) 5 mg tablet Take 10 mg by mouth nightly.     ZOLMitriptan (ZOMIG) 5 mg nasal solution 1 spray in nostril at HA onset. May repeat again in 2 hours X 1 dose. No mor then 2 doses in 24 hours    TROKENDI  mg capsule TAKE 1 CAPSULE BY MOUTH NIGHTLY    olmesartan (BENICAR) 20 mg tablet Take 40 mg by mouth in the morning.  doxycycline (MONODOX) 100 mg capsule Take 100 mg by mouth two (2) times a day. (Patient not taking: Reported on 8/9/2022)     No current facility-administered medications for this visit. Social History     Tobacco Use   Smoking Status Former   Smokeless Tobacco Never       Past Medical History:   Diagnosis Date    Hypertension     Migraines     Prostatitis     Seizures ([address]     Stroke ([facility] [address]        Past Surgical History:   Procedure Laterality Date    HX ACL RECONSTRUCTION      right knee 6 times       Family History   Problem Relation Age of Onset    Cancer Father         prostate    Stroke Maternal Aunt     Diabetes Sister        Social History     Socioeconomic History    Marital status:    Tobacco Use    Smoking status: Former    Smokeless tobacco: Never   Substance and Sexual Activity    Alcohol use: Yes     Comment: rarely    Drug use: No       Review of Systems   Eyes:  Negative for blurred vision, double vision and photophobia. Respiratory:  Negative for shortness of breath and wheezing. Gastrointestinal:  Negative for nausea and vomiting. Neurological:  Positive for headaches. Negative for dizziness, seizures, loss of consciousness and weakness. Remainder of comprehensive review is negative. Physical Exam :    Visit Vitals  /66   Pulse 68   Temp 97.5 °F (36.4 °C)   SpO2 95%       General: Well defined, nourished, and groomed individual in no acute distress. Neck: Supple, nontender, no bruits, no pain with resistance to active range of motion. Musculoskeletal: Extremities revealed no edema and had full range of motion of joints.     Psych: Good mood and bright affect    NEUROLOGICAL EXAMINATION:    Mental Status: Alert and oriented to person, place, and time    Cranial Nerves:    II, III, IV, VI: Visual acuity grossly intact. Visual fields are normal.    Pupils are equal, round, and reactive to light and accommodation. Extra-ocular movements are full and fluid. Fundoscopic exam was benign, no ptosis or nystagmus. V-XII: Hearing is grossly intact. Facial features are symmetric, with normal sensation and strength. The palate rises symmetrically and the tongue protrudes midline. Sternocleidomastoids 5/5. Motor Examination: Normal tone, bulk, and strength, 5/5 muscle strength throughout. Coordination: Finger to nose was normal. No resting or intention tremor    Gait and Station: Steady while walking. Normal arm swing. No pronator drift. No muscle wasting or fasiculations noted. Reflexes: DTRs 2+ throughout. Results for orders placed or performed during the hospital encounter of 07/21/19   SAMPLES BEING HELD   Result Value Ref Range    SAMPLES BEING HELD 1RED,1GRN,1BLUE     COMMENT        Add-on orders for these samples will be processed based on acceptable specimen integrity and analyte stability, which may vary by analyte. METABOLIC PANEL, COMPREHENSIVE   Result Value Ref Range    Sodium 140 136 - 145 mmol/L    Potassium 3.6 3.5 - 5.1 mmol/L    Chloride 104 97 - 108 mmol/L    CO2 24 21 - 32 mmol/L    Anion gap 12 5 - 15 mmol/L    Glucose 105 (H) 65 - 100 mg/dL    BUN 15 6 - 20 MG/DL    Creatinine 0.81 0.70 - 1.30 MG/DL    BUN/Creatinine ratio 19 12 - 20      GFR est AA >60 >60 ml/min/1.73m2    GFR est non-AA >60 >60 ml/min/1.73m2    Calcium 8.5 8.5 - 10.1 MG/DL    Bilirubin, total 0.2 0.2 - 1.0 MG/DL    ALT (SGPT) 69 12 - 78 U/L    AST (SGOT) 25 15 - 37 U/L    Alk.  phosphatase 72 45 - 117 U/L    Protein, total 7.2 6.4 - 8.2 g/dL    Albumin 4.2 3.5 - 5.0 g/dL    Globulin 3.0 2.0 - 4.0 g/dL    A-G Ratio 1.4 1.1 - 2.2     CBC WITH AUTOMATED DIFF   Result Value Ref Range    WBC 8.0 4.1 - 11.1 K/uL RBC 4.65 4.10 - 5.70 M/uL    HGB 14.2 12.1 - 17.0 g/dL    HCT 41.5 36.6 - 50.3 %    MCV 89.2 80.0 - 99.0 FL    MCH 30.5 26.0 - 34.0 PG    MCHC 34.2 30.0 - 36.5 g/dL    RDW 13.3 11.5 - 14.5 %    PLATELET 645 384 - 746 K/uL    MPV 8.6 (L) 8.9 - 12.9 FL    NEUTROPHILS 58 32 - 75 %    LYMPHOCYTES 30 12 - 49 %    MONOCYTES 10 5 - 13 %    EOSINOPHILS 2 0 - 7 %    BASOPHILS 0 0 - 1 %    ABS. NEUTROPHILS 4.7 1.8 - 8.0 K/UL    ABS. LYMPHOCYTES 2.4 K/UL    ABS. MONOCYTES 0.8 0.0 - 1.0 K/UL    ABS. EOSINOPHILS 0.2 0.0 - 0.4 K/UL    ABS.  BASOPHILS 0.0 0.0 - 0.1 K/UL    DF AUTOMATED     TROPONIN I   Result Value Ref Range    Troponin-I, Qt. <0.05 <0.05 ng/mL   EKG, 12 LEAD, SUBSEQUENT   Result Value Ref Range    Ventricular Rate 54 BPM    Atrial Rate 54 BPM    P-R Interval 170 ms    QRS Duration 94 ms    Q-T Interval 442 ms    QTC Calculation (Bezet) 419 ms    Calculated P Axis 23 degrees    Calculated R Axis 27 degrees    Calculated T Axis 8 degrees    Diagnosis       Sinus bradycardia  Moderate voltage criteria for LVH, may be normal variant  Inferior infarct , age undetermined  Anterior infarct , age undetermined  Abnormal ECG  Confirmed by Promise Isbell MD., [facility] ([id number]) on 2019 8:56:59 AM     EKG, 12 LEAD, INITIAL   Result Value Ref Range    Ventricular Rate 60 BPM    Atrial Rate 60 BPM    P-R Interval 172 ms    QRS Duration 100 ms    Q-T Interval 426 ms    QTC Calculation (Bezet) 426 ms    Calculated P Axis 44 degrees    Calculated R Axis 48 degrees    Calculated T Axis 76 degrees    Diagnosis       Normal sinus rhythm  Normal ECG  When compared with ECG of 10-OCT-2017 06:45,  No significant change was found  Confirmed by Promise Isbell MD., [employer] ([id number]) on 2019 8:55:59 AM         Orders Placed This Encounter    SUMAtriptan (IMITREX) 100 mg tablet     Sig: TAKE 1 TABLET BY MOUTH AS NEEDED FOR MIGRAINEMAY REPEAT IN 2 HOURS MAX 2 PER DAY    midazolam (Nayzilam) 5 mg/spray (0.1 mL) spry     [place] by Both Nostrils route daily as needed (seizure). Dispense:  2 Each     Refill:  5    topiramate ER (Trokendi XR) 50 mg capsule     Si capsule by mouth nightly. Total 250 mg     Dispense:  90 Capsule     Refill:  5    topiramate ER (Trokendi XR) 200 mg capsule     Sig: Take 1 capsule by mouth nightly. Total 250 mg     Dispense:  90 Capsule     Refill:  1       1. Partial epilepsy with impairment of consciousness (HCC)          Increase Trokendi XR from 200 to 250 mg nightly for prevention of episodes. He will get Nayzilam to have for PRN if he feels an aura, or a event coming on   He has recent testing in December. He will keep us posted if anything changes.            This note will not be viewable in [facility] [employer]

## 2022-10-18 ENCOUNTER — TRANSCRIBE ORDER (OUTPATIENT)
Dept: SCHEDULING | Age: 62
End: 2022-10-18

## 2022-10-18 ENCOUNTER — HOSPITAL ENCOUNTER (OUTPATIENT)
Dept: RADIATION THERAPY | Age: 62
Discharge: HOME | End: 2022-10-18

## 2022-10-18 VITALS — BODY MASS INDEX: 29.13 KG/M2 | HEIGHT: 74 IN | WEIGHT: 227 LBS

## 2022-10-18 DIAGNOSIS — C61 PROSTATE CANCER (HCC): Primary | ICD-10-CM

## 2022-10-18 DIAGNOSIS — F41.9 ANXIETY: ICD-10-CM

## 2022-10-18 RX ORDER — LORAZEPAM 0.5 MG/1
TABLET ORAL
Qty: 3 TABLET | Refills: 0 | Status: SHIPPED | OUTPATIENT
Start: 2022-10-18

## 2022-11-07 ENCOUNTER — HOSPITAL ENCOUNTER (OUTPATIENT)
Dept: PET IMAGING | Age: 62
Discharge: HOME | End: 2022-11-07
Attending: RADIOLOGY

## 2022-11-07 VITALS — WEIGHT: 227 LBS | BODY MASS INDEX: 29.13 KG/M2 | HEIGHT: 74 IN

## 2022-11-07 DIAGNOSIS — C61 PROSTATE CANCER (HCC): ICD-10-CM

## 2022-11-07 NOTE — PROGRESS NOTES
Patient was injected for PET scan and took 1 mg PO valium, but was unable to complete scan due to claustophobia

## 2022-11-10 DIAGNOSIS — C61 PROSTATE CANCER (HCC): ICD-10-CM

## 2022-11-10 DIAGNOSIS — F41.9 ANXIETY: ICD-10-CM

## 2022-11-10 RX ORDER — LORAZEPAM 1 MG/1
TABLET ORAL
Qty: 5 TABLET | Refills: 0 | Status: SHIPPED | OUTPATIENT
Start: 2022-11-10

## 2022-12-02 ENCOUNTER — HOSPITAL ENCOUNTER (OUTPATIENT)
Dept: PET IMAGING | Age: 62
End: 2022-12-02
Attending: RADIOLOGY
Payer: COMMERCIAL

## 2022-12-02 DIAGNOSIS — C61 PROSTATE CANCER (HCC): ICD-10-CM

## 2022-12-02 PROCEDURE — 78815 PET IMAGE W/CT SKULL-THIGH: CPT

## 2022-12-23 ENCOUNTER — TRANSCRIBE ORDER (OUTPATIENT)
Dept: SCHEDULING | Age: 62
End: 2022-12-23

## 2022-12-23 DIAGNOSIS — C61 PROSTATIC CANCER (HCC): Primary | ICD-10-CM

## 2023-01-30 DIAGNOSIS — C61 PROSTATE CANCER (HCC): ICD-10-CM

## 2023-01-30 DIAGNOSIS — F41.9 ANXIETY: ICD-10-CM

## 2023-01-30 RX ORDER — LORAZEPAM 1 MG/1
TABLET ORAL
Qty: 5 TABLET | Refills: 0 | Status: SHIPPED | OUTPATIENT
Start: 2023-01-30

## 2023-02-01 ENCOUNTER — HOSPITAL ENCOUNTER (OUTPATIENT)
Dept: MRI IMAGING | Age: 63
Discharge: HOME | End: 2023-02-01
Attending: RADIOLOGY
Payer: COMMERCIAL

## 2023-02-01 DIAGNOSIS — C61 PROSTATIC CANCER (HCC): ICD-10-CM

## 2023-04-20 RX ORDER — TOPIRAMATE 200 MG/1
CAPSULE, EXTENDED RELEASE ORAL
Qty: 90 CAPSULE | Refills: 1 | Status: SHIPPED | OUTPATIENT
Start: 2023-04-20

## 2023-04-21 DIAGNOSIS — C61 PROSTATE CANCER (HCC): Primary | ICD-10-CM

## 2023-12-07 PROBLEM — C61 MALIGNANT NEOPLASM OF PROSTATE (HCC): Status: ACTIVE | Noted: 2023-12-07

## 2023-12-08 ENCOUNTER — HOSPITAL ENCOUNTER (OUTPATIENT)
Facility: HOSPITAL | Age: 63
End: 2023-12-08

## 2023-12-08 VITALS
HEART RATE: 84 BPM | DIASTOLIC BLOOD PRESSURE: 82 MMHG | BODY MASS INDEX: 31.18 KG/M2 | RESPIRATION RATE: 18 BRPM | HEIGHT: 74 IN | WEIGHT: 243 LBS | SYSTOLIC BLOOD PRESSURE: 152 MMHG

## 2023-12-08 DIAGNOSIS — C61 MALIGNANT NEOPLASM OF PROSTATE (HCC): Primary | ICD-10-CM

## 2023-12-08 ASSESSMENT — PAIN SCALES - GENERAL: PAINLEVEL_OUTOF10: 0
